# Patient Record
Sex: FEMALE | Race: WHITE | NOT HISPANIC OR LATINO | URBAN - METROPOLITAN AREA
[De-identification: names, ages, dates, MRNs, and addresses within clinical notes are randomized per-mention and may not be internally consistent; named-entity substitution may affect disease eponyms.]

---

## 2023-11-20 ENCOUNTER — APPOINTMENT (OUTPATIENT)
Dept: PRIMARY CARE | Facility: CLINIC | Age: 28
End: 2023-11-20
Payer: COMMERCIAL

## 2023-12-13 ENCOUNTER — OFFICE VISIT (OUTPATIENT)
Dept: PRIMARY CARE | Facility: CLINIC | Age: 28
End: 2023-12-13
Payer: COMMERCIAL

## 2023-12-13 ENCOUNTER — LAB (OUTPATIENT)
Dept: LAB | Facility: LAB | Age: 28
End: 2023-12-13
Payer: COMMERCIAL

## 2023-12-13 VITALS
SYSTOLIC BLOOD PRESSURE: 96 MMHG | DIASTOLIC BLOOD PRESSURE: 70 MMHG | OXYGEN SATURATION: 98 % | BODY MASS INDEX: 26.83 KG/M2 | WEIGHT: 145.8 LBS | TEMPERATURE: 98.2 F | HEIGHT: 62 IN | HEART RATE: 75 BPM

## 2023-12-13 DIAGNOSIS — F32.A DEPRESSION, UNSPECIFIED DEPRESSION TYPE: ICD-10-CM

## 2023-12-13 DIAGNOSIS — M25.50 MULTIPLE JOINT PAIN: ICD-10-CM

## 2023-12-13 DIAGNOSIS — F41.1 GENERALIZED ANXIETY DISORDER: Primary | ICD-10-CM

## 2023-12-13 DIAGNOSIS — R53.83 FATIGUE, UNSPECIFIED TYPE: ICD-10-CM

## 2023-12-13 DIAGNOSIS — T69.1XXA CHILBLAINS, INITIAL ENCOUNTER: ICD-10-CM

## 2023-12-13 PROBLEM — L70.0 ACNE VULGARIS: Status: ACTIVE | Noted: 2020-11-22

## 2023-12-13 LAB
ALBUMIN SERPL BCP-MCNC: 4.7 G/DL (ref 3.4–5)
ALP SERPL-CCNC: 56 U/L (ref 33–110)
ALT SERPL W P-5'-P-CCNC: 13 U/L (ref 7–45)
ANION GAP SERPL CALC-SCNC: 12 MMOL/L (ref 10–20)
AST SERPL W P-5'-P-CCNC: 18 U/L (ref 9–39)
BASOPHILS # BLD AUTO: 0.08 X10*3/UL (ref 0–0.1)
BASOPHILS NFR BLD AUTO: 1.3 %
BILIRUB SERPL-MCNC: 0.5 MG/DL (ref 0–1.2)
BUN SERPL-MCNC: 11 MG/DL (ref 6–23)
CALCIUM SERPL-MCNC: 9.2 MG/DL (ref 8.6–10.3)
CHLORIDE SERPL-SCNC: 103 MMOL/L (ref 98–107)
CO2 SERPL-SCNC: 27 MMOL/L (ref 21–32)
CREAT SERPL-MCNC: 0.85 MG/DL (ref 0.5–1.05)
EOSINOPHIL # BLD AUTO: 0.22 X10*3/UL (ref 0–0.7)
EOSINOPHIL NFR BLD AUTO: 3.6 %
ERYTHROCYTE [DISTWIDTH] IN BLOOD BY AUTOMATED COUNT: 13.1 % (ref 11.5–14.5)
ERYTHROCYTE [SEDIMENTATION RATE] IN BLOOD BY WESTERGREN METHOD: 4 MM/H (ref 0–20)
GFR SERPL CREATININE-BSD FRML MDRD: >90 ML/MIN/1.73M*2
GLUCOSE SERPL-MCNC: 81 MG/DL (ref 74–99)
HCT VFR BLD AUTO: 44.5 % (ref 36–46)
HGB BLD-MCNC: 14.6 G/DL (ref 12–16)
IMM GRANULOCYTES # BLD AUTO: 0.01 X10*3/UL (ref 0–0.7)
IMM GRANULOCYTES NFR BLD AUTO: 0.2 % (ref 0–0.9)
LYMPHOCYTES # BLD AUTO: 1.8 X10*3/UL (ref 1.2–4.8)
LYMPHOCYTES NFR BLD AUTO: 29.4 %
MCH RBC QN AUTO: 29.3 PG (ref 26–34)
MCHC RBC AUTO-ENTMCNC: 32.8 G/DL (ref 32–36)
MCV RBC AUTO: 89 FL (ref 80–100)
MONOCYTES # BLD AUTO: 0.49 X10*3/UL (ref 0.1–1)
MONOCYTES NFR BLD AUTO: 8 %
NEUTROPHILS # BLD AUTO: 3.52 X10*3/UL (ref 1.2–7.7)
NEUTROPHILS NFR BLD AUTO: 57.5 %
NRBC BLD-RTO: 0 /100 WBCS (ref 0–0)
PLATELET # BLD AUTO: 270 X10*3/UL (ref 150–450)
POTASSIUM SERPL-SCNC: 4.5 MMOL/L (ref 3.5–5.3)
PROT SERPL-MCNC: 7.3 G/DL (ref 6.4–8.2)
RBC # BLD AUTO: 4.99 X10*6/UL (ref 4–5.2)
SODIUM SERPL-SCNC: 137 MMOL/L (ref 136–145)
TSH SERPL-ACNC: 1.83 MIU/L (ref 0.44–3.98)
URATE SERPL-MCNC: 3.2 MG/DL (ref 2.3–6.7)
WBC # BLD AUTO: 6.1 X10*3/UL (ref 4.4–11.3)

## 2023-12-13 PROCEDURE — 85025 COMPLETE CBC W/AUTO DIFF WBC: CPT

## 2023-12-13 PROCEDURE — 84443 ASSAY THYROID STIM HORMONE: CPT

## 2023-12-13 PROCEDURE — 86038 ANTINUCLEAR ANTIBODIES: CPT

## 2023-12-13 PROCEDURE — 1036F TOBACCO NON-USER: CPT | Performed by: NURSE PRACTITIONER

## 2023-12-13 PROCEDURE — 86431 RHEUMATOID FACTOR QUANT: CPT

## 2023-12-13 PROCEDURE — 80053 COMPREHEN METABOLIC PANEL: CPT

## 2023-12-13 PROCEDURE — 84550 ASSAY OF BLOOD/URIC ACID: CPT

## 2023-12-13 PROCEDURE — 36415 COLL VENOUS BLD VENIPUNCTURE: CPT

## 2023-12-13 PROCEDURE — 99203 OFFICE O/P NEW LOW 30 MIN: CPT | Performed by: NURSE PRACTITIONER

## 2023-12-13 PROCEDURE — 85652 RBC SED RATE AUTOMATED: CPT

## 2023-12-13 RX ORDER — OFLOXACIN 3 MG/ML
SOLUTION AURICULAR (OTIC) EVERY 24 HOURS
COMMUNITY
Start: 2023-12-06 | End: 2024-01-15 | Stop reason: ALTCHOICE

## 2023-12-13 RX ORDER — AMOXICILLIN AND CLAVULANATE POTASSIUM 875; 125 MG/1; MG/1
TABLET, FILM COATED ORAL EVERY 12 HOURS
COMMUNITY
Start: 2023-11-28 | End: 2024-01-15 | Stop reason: ALTCHOICE

## 2023-12-13 RX ORDER — SERTRALINE HYDROCHLORIDE 50 MG/1
50 TABLET, FILM COATED ORAL DAILY
Qty: 30 TABLET | Refills: 0 | Status: SHIPPED | OUTPATIENT
Start: 2023-12-13 | End: 2024-01-15 | Stop reason: SDUPTHER

## 2023-12-13 ASSESSMENT — ENCOUNTER SYMPTOMS
COUGH: 0
CHILLS: 0
BACK PAIN: 1
NECK STIFFNESS: 1
EYE DISCHARGE: 0
DYSPHORIC MOOD: 1
SINUS PRESSURE: 0
NERVOUS/ANXIOUS: 1
DIFFICULTY URINATING: 0
VOMITING: 0
FREQUENCY: 0
NECK PAIN: 1
POLYDIPSIA: 0
WHEEZING: 0
EYE PAIN: 0
WEAKNESS: 0
SLEEP DISTURBANCE: 1
ABDOMINAL PAIN: 0
CHEST TIGHTNESS: 0
MYALGIAS: 0
CONSTIPATION: 0
ARTHRALGIAS: 1
PHOTOPHOBIA: 0
POLYPHAGIA: 0
HEMATURIA: 0
SHORTNESS OF BREATH: 0
PALPITATIONS: 0
DIARRHEA: 0
RHINORRHEA: 0
EYE ITCHING: 0
EYE REDNESS: 0
SORE THROAT: 0
HEADACHES: 0
NAUSEA: 0
BLOOD IN STOOL: 0
DIZZINESS: 1
FATIGUE: 1
ADENOPATHY: 0
UNEXPECTED WEIGHT CHANGE: 0
FEVER: 0
NUMBNESS: 0
SPEECH DIFFICULTY: 0
DYSURIA: 0
BRUISES/BLEEDS EASILY: 0

## 2023-12-13 ASSESSMENT — PATIENT HEALTH QUESTIONNAIRE - PHQ9
4. FEELING TIRED OR HAVING LITTLE ENERGY: NEARLY EVERY DAY
7. TROUBLE CONCENTRATING ON THINGS, SUCH AS READING THE NEWSPAPER OR WATCHING TELEVISION: SEVERAL DAYS
8. MOVING OR SPEAKING SO SLOWLY THAT OTHER PEOPLE COULD HAVE NOTICED. OR THE OPPOSITE, BEING SO FIGETY OR RESTLESS THAT YOU HAVE BEEN MOVING AROUND A LOT MORE THAN USUAL: NEARLY EVERY DAY
SUM OF ALL RESPONSES TO PHQ QUESTIONS 1-9: 19
5. POOR APPETITE OR OVEREATING: MORE THAN HALF THE DAYS
3. TROUBLE FALLING OR STAYING ASLEEP OR SLEEPING TOO MUCH: NEARLY EVERY DAY
10. IF YOU CHECKED OFF ANY PROBLEMS, HOW DIFFICULT HAVE THESE PROBLEMS MADE IT FOR YOU TO DO YOUR WORK, TAKE CARE OF THINGS AT HOME, OR GET ALONG WITH OTHER PEOPLE: VERY DIFFICULT
SUM OF ALL RESPONSES TO PHQ9 QUESTIONS 1 AND 2: 6
2. FEELING DOWN, DEPRESSED OR HOPELESS: SEVERAL DAYS
9. THOUGHTS THAT YOU WOULD BE BETTER OFF DEAD, OR OF HURTING YOURSELF: NOT AT ALL
SUM OF ALL RESPONSES TO PHQ9 QUESTIONS 1 AND 2: 4
1. LITTLE INTEREST OR PLEASURE IN DOING THINGS: NEARLY EVERY DAY
2. FEELING DOWN, DEPRESSED OR HOPELESS: NEARLY EVERY DAY
1. LITTLE INTEREST OR PLEASURE IN DOING THINGS: NEARLY EVERY DAY
6. FEELING BAD ABOUT YOURSELF - OR THAT YOU ARE A FAILURE OR HAVE LET YOURSELF OR YOUR FAMILY DOWN: NEARLY EVERY DAY

## 2023-12-13 ASSESSMENT — ANXIETY QUESTIONNAIRES
7. FEELING AFRAID AS IF SOMETHING AWFUL MIGHT HAPPEN: MORE THAN HALF THE DAYS
IF YOU CHECKED OFF ANY PROBLEMS ON THIS QUESTIONNAIRE, HOW DIFFICULT HAVE THESE PROBLEMS MADE IT FOR YOU TO DO YOUR WORK, TAKE CARE OF THINGS AT HOME, OR GET ALONG WITH OTHER PEOPLE: VERY DIFFICULT
4. TROUBLE RELAXING: MORE THAN HALF THE DAYS
GAD7 TOTAL SCORE: 18
6. BECOMING EASILY ANNOYED OR IRRITABLE: NEARLY EVERY DAY
1. FEELING NERVOUS, ANXIOUS, OR ON EDGE: NEARLY EVERY DAY
3. WORRYING TOO MUCH ABOUT DIFFERENT THINGS: NEARLY EVERY DAY
5. BEING SO RESTLESS THAT IT IS HARD TO SIT STILL: MORE THAN HALF THE DAYS
2. NOT BEING ABLE TO STOP OR CONTROL WORRYING: NEARLY EVERY DAY

## 2023-12-13 NOTE — PATIENT INSTRUCTIONS
Begin taking the Sertraline- take 0.5 tablet (25 mg) by mouth daily for one week, then increase to one tablet (50 mg).  Obtain the blood work as ordered today.  Follow up in one month.

## 2023-12-13 NOTE — PROGRESS NOTES
"Subjective Elexandra Lombardo is a 28 y.o. female who presents for New Patient Visit (Establish care and here because she has questions. She hasn't seen anyone in awhile.).    HPI  She presents the office today to establish care and discuss mood and multiple joint pain.  (+) Feeling sad, down, helpless and hopeless at times  Motivation is a struggle. Going to work is a struggle.   Lacks energy.  (+) emotional- things she reads causes her to be very emotional. (Mainly social media)  Works as a cosmotologist  No SI or HI.  (+) excessive worry mainly about son with autism  (+) Worry about worst case scenarios.  No panic attacks.  Was on Lexapro in the past about 8 years ago. She did not like how she felt on it.    Counseling: Currently in therapy online for the past 5-6 months.  Sleep is an issues- has a hard time staying asleep.   Marijuana nightly to help with sleep.  Diet: \"not great\" Has an addiction to fast food.  Exercise: No regular exercise.  Caffeine use: 2 cups of coffee a day  Alcohol use: 2 drinks a week  Drug use: THC at night  Lives with son and .  is supportive.    She reports she has always dealt with extreme fatigue.  (+) aching in all joints.  Has been getting a burning rash to her face after a shower.  In March had an episode where most of her toes turned white and numb. Stayed white for most of the day. Then feet starting to burn and developed little blisters that took a long time to go away.   She has pictures on her phone.  She is concerned because several of her family member have Lupus.    Review of Systems   Constitutional:  Positive for fatigue. Negative for chills, fever and unexpected weight change.   HENT:  Negative for congestion, ear pain, hearing loss, nosebleeds, postnasal drip, rhinorrhea, sinus pressure, sore throat and tinnitus.    Eyes:  Negative for photophobia, pain, discharge, redness, itching and visual disturbance.   Respiratory:  Negative for cough, chest " "tightness, shortness of breath and wheezing.    Cardiovascular:  Negative for chest pain, palpitations and leg swelling.   Gastrointestinal:  Negative for abdominal pain, blood in stool, constipation, diarrhea, nausea and vomiting.   Endocrine: Negative for cold intolerance, heat intolerance, polydipsia, polyphagia and polyuria.   Genitourinary:  Negative for difficulty urinating, dysuria, frequency, hematuria and urgency.   Musculoskeletal:  Positive for arthralgias, back pain, neck pain and neck stiffness. Negative for myalgias.   Skin:  Negative for rash.   Neurological:  Positive for dizziness. Negative for syncope, speech difficulty, weakness, numbness and headaches.        Vertigo occasionally for years.   Hematological:  Negative for adenopathy. Does not bruise/bleed easily.   Psychiatric/Behavioral:  Positive for dysphoric mood and sleep disturbance. Negative for suicidal ideas. The patient is nervous/anxious.      Objective   BP 96/70 (BP Location: Left arm, Patient Position: Sitting)   Pulse 75   Temp 36.8 °C (98.2 °F) (Temporal)   Ht 1.578 m (5' 2.13\")   Wt 66.1 kg (145 lb 12.8 oz)   SpO2 98%   BMI 26.56 kg/m²     Physical Exam  Constitutional:       General: She is not in acute distress.     Appearance: Normal appearance. She is not toxic-appearing.   Eyes:      Extraocular Movements: Extraocular movements intact.      Conjunctiva/sclera: Conjunctivae normal.      Pupils: Pupils are equal, round, and reactive to light.   Neck:      Thyroid: No thyroid mass or thyromegaly.   Cardiovascular:      Rate and Rhythm: Normal rate and regular rhythm.      Pulses: Normal pulses.      Heart sounds: Normal heart sounds, S1 normal and S2 normal. No murmur heard.  Pulmonary:      Effort: Pulmonary effort is normal. No respiratory distress.      Breath sounds: Normal breath sounds.   Abdominal:      General: Bowel sounds are normal.      Palpations: Abdomen is soft.      Tenderness: There is no abdominal " tenderness.   Musculoskeletal:      Right lower leg: No edema.      Left lower leg: No edema.   Lymphadenopathy:      Cervical: No cervical adenopathy.   Neurological:      Mental Status: She is alert and oriented to person, place, and time.   Psychiatric:         Attention and Perception: Attention normal.         Mood and Affect: Mood and affect normal.         Behavior: Behavior normal. Behavior is cooperative.         Thought Content: Thought content normal.         Cognition and Memory: Cognition normal.         Judgment: Judgment normal.         Assessment/Plan   Problem List Items Addressed This Visit       Fatigue     Check labs today.         Relevant Orders    Comprehensive Metabolic Panel (Completed)    CBC and Auto Differential (Completed)    Arthritis Panel (CMS)    TSH (Completed)    Erythema pernio     Check arthritis panel.          Relevant Orders    Comprehensive Metabolic Panel (Completed)    CBC and Auto Differential (Completed)    Arthritis Panel (CMS)    TSH (Completed)    Depression     Start Sertraline as directed. Follow up in one month.         Relevant Medications    sertraline (Zoloft) 50 mg tablet    Generalized anxiety disorder - Primary     Start Sertraline as directed. Follow up in one month.         Relevant Medications    sertraline (Zoloft) 50 mg tablet     Other Visit Diagnoses       Multiple joint pain        Relevant Orders    Comprehensive Metabolic Panel (Completed)    CBC and Auto Differential (Completed)    Arthritis Panel (CMS)    TSH (Completed)            It has been a pleasure seeing you today!

## 2023-12-14 LAB
ANA SER QL HEP2 SUBST: NEGATIVE
RHEUMATOID FACT SER NEPH-ACNC: <10 IU/ML (ref 0–15)

## 2023-12-15 DIAGNOSIS — R53.83 FATIGUE, UNSPECIFIED TYPE: ICD-10-CM

## 2023-12-15 DIAGNOSIS — T69.1XXA CHILBLAINS, INITIAL ENCOUNTER: Primary | ICD-10-CM

## 2024-01-15 ENCOUNTER — TELEMEDICINE (OUTPATIENT)
Dept: PRIMARY CARE | Facility: CLINIC | Age: 29
End: 2024-01-15
Payer: COMMERCIAL

## 2024-01-15 DIAGNOSIS — F41.1 GENERALIZED ANXIETY DISORDER: ICD-10-CM

## 2024-01-15 DIAGNOSIS — F32.A DEPRESSION, UNSPECIFIED DEPRESSION TYPE: Primary | ICD-10-CM

## 2024-01-15 PROCEDURE — 99213 OFFICE O/P EST LOW 20 MIN: CPT | Performed by: NURSE PRACTITIONER

## 2024-01-15 RX ORDER — SERTRALINE HYDROCHLORIDE 50 MG/1
50 TABLET, FILM COATED ORAL DAILY
Qty: 90 TABLET | Refills: 0 | Status: SHIPPED | OUTPATIENT
Start: 2024-01-15 | End: 2024-05-07 | Stop reason: SDUPTHER

## 2024-01-15 ASSESSMENT — PATIENT HEALTH QUESTIONNAIRE - PHQ9
2. FEELING DOWN, DEPRESSED OR HOPELESS: MORE THAN HALF THE DAYS
1. LITTLE INTEREST OR PLEASURE IN DOING THINGS: NOT AT ALL
SUM OF ALL RESPONSES TO PHQ9 QUESTIONS 1 AND 2: 2

## 2024-01-15 ASSESSMENT — ENCOUNTER SYMPTOMS
CHILLS: 0
SLEEP DISTURBANCE: 0
NERVOUS/ANXIOUS: 1
FATIGUE: 0
FEVER: 0
DYSPHORIC MOOD: 1

## 2024-01-16 NOTE — PROGRESS NOTES
Subjective   Elexandra Lombardo is a 28 y.o. female who presents for Follow-up (1 mon fu on depression).    An interactive audio and video telecommunication system which permits real time communications between the patient (at the originating site) and provider (at the distant site) was utilized to provide this telehealth service.    Verbal consent was requested and obtained from Elexandra Lombardo for a telehealth visit. All issues as below were discussed and addressed but no physical exam was performed. If it was felt that the patient should be evaluated in the clinic then they were directed there. The patient verbally consented to the visit.  She was located in the [facility] for the visit.        HPI  She presents today virtually for a follow-up on anxiety and depression.  She is taking the medication as prescribed. Taking daily in the morning.  Side effects: Occasional nausea but not occurring every day.  (+) Feeling sad, down, helpless and hopeless, but improved  Overall much less emotional than she used to be- less crying  Motivation is still not great.  No SI or HI.  Reports anxiety has improved some- a little less often.  No panic attacks.     Sleep: Better  Still uses marijuana at night to help    Review of Systems   Constitutional:  Negative for chills, fatigue and fever.   Psychiatric/Behavioral:  Positive for dysphoric mood. Negative for self-injury, sleep disturbance and suicidal ideas. The patient is nervous/anxious.        Objective   There were no vitals taken for this visit.    Physical Exam  Constitutional:       General: She is not in acute distress.     Appearance: Normal appearance. She is not toxic-appearing.   Pulmonary:      Effort: Pulmonary effort is normal. No respiratory distress.   Neurological:      Mental Status: She is alert and oriented to person, place, and time.   Psychiatric:         Mood and Affect: Mood normal.         Behavior: Behavior normal.         Thought Content:  Thought content normal.         Judgment: Judgment normal.         Assessment/Plan   Problem List Items Addressed This Visit       Depression - Primary     Improved on the sertraline 50 mg daily.  Will continue with current dose and follow-up in 3 months.         Relevant Medications    sertraline (Zoloft) 50 mg tablet    Generalized anxiety disorder     Improved on the sertraline 50 mg daily.  Will continue with current dose and follow-up in 3 months.         Relevant Medications    sertraline (Zoloft) 50 mg tablet       It has been a pleasure seeing you today!

## 2024-01-16 NOTE — ASSESSMENT & PLAN NOTE
Improved on the sertraline 50 mg daily.  Will continue with current dose and follow-up in 3 months.

## 2024-05-07 DIAGNOSIS — F32.A DEPRESSION, UNSPECIFIED DEPRESSION TYPE: ICD-10-CM

## 2024-05-07 DIAGNOSIS — F41.1 GENERALIZED ANXIETY DISORDER: ICD-10-CM

## 2024-05-07 RX ORDER — SERTRALINE HYDROCHLORIDE 50 MG/1
50 TABLET, FILM COATED ORAL DAILY
Qty: 90 TABLET | Refills: 0 | Status: SHIPPED | OUTPATIENT
Start: 2024-05-07

## 2024-06-11 ENCOUNTER — PROCEDURE VISIT (OUTPATIENT)
Dept: OBSTETRICS AND GYNECOLOGY | Facility: CLINIC | Age: 29
End: 2024-06-11
Payer: COMMERCIAL

## 2024-06-11 VITALS
BODY MASS INDEX: 26.63 KG/M2 | WEIGHT: 146.16 LBS | DIASTOLIC BLOOD PRESSURE: 70 MMHG | SYSTOLIC BLOOD PRESSURE: 110 MMHG

## 2024-06-11 DIAGNOSIS — Z11.51 SCREENING FOR HUMAN PAPILLOMAVIRUS (HPV): ICD-10-CM

## 2024-06-11 DIAGNOSIS — Z30.432 ENCOUNTER FOR IUD REMOVAL: ICD-10-CM

## 2024-06-11 DIAGNOSIS — Z01.419 ENCOUNTER FOR WELL WOMAN EXAM WITH ROUTINE GYNECOLOGICAL EXAM: Primary | ICD-10-CM

## 2024-06-11 DIAGNOSIS — Z12.4 SCREENING FOR MALIGNANT NEOPLASM OF CERVIX: ICD-10-CM

## 2024-06-11 PROCEDURE — 99385 PREV VISIT NEW AGE 18-39: CPT | Performed by: NURSE PRACTITIONER

## 2024-06-11 PROCEDURE — 58301 REMOVE INTRAUTERINE DEVICE: CPT | Performed by: NURSE PRACTITIONER

## 2024-06-11 PROCEDURE — 87624 HPV HI-RISK TYP POOLED RSLT: CPT

## 2024-06-11 NOTE — ASSESSMENT & PLAN NOTE
Here for well woman exam. She is doing well. Needs her IUD removed. She is considering another pregnancy in the future. She will start a prenatal vitamin. PAP obtained.

## 2024-06-11 NOTE — PROGRESS NOTES
"     HPI:   Elexandra Lombardo \"Micheal\" is a 28 y.o. who presents today for her annual gynecologic exam without complaints    She has the following concerns; None. Doing well.  Would like her iud removed. Considering another pregnancy. Declines any other type of birth control. She is due for a PAP.     GYN HISTORY:  Periods are rare, lasting 1 days. With Mirena.   Dysmenorrhea:none. Cyclic symptoms include none.   No intermenstrual bleeding, spotting, or discharge.    Current contraception: IUD      Requests STD testing: no     PAP History   Last pap:   2021 Normal HPV Not done  History of abnormal pap: no    Health Screening  Family history of breast, uterine, ovarian or colon cancer: yes - paternal aunt has breast cancer          The patient feels safe at home.         Review of Systems:   Constitutional: no fever and no chills.  Cardiovascular: no chest pain.   Respiratory: no shortness of breath.   Gastrointestinal: no nausea, no abdominal pain and no constipation  Genitourinary: no dysuria, no urinary incontinence, no vaginal dryness, no pelvic pain and no vaginal discharge.   Neurological: no headache.  Psychiatric: no anxiety and no depression.              Objective         /70   Wt 66.3 kg (146 lb 2.6 oz)   BMI 26.63 kg/m²         Physical Exam:   Constitutional: Alert and in no acute distress. Well developed, well nourished.      Neck: No neck asymmetry. Supple. Thyroid not enlarged and there were no palpable thyroid nodules.      Cardiovascular: Heart rate and rhythm were normal, normal S1 and S2, no gallops, and no murmurs.      Pulmonary: No respiratory distress. Clear bilateral breath sounds.      Chest: Breasts: Normal appearance, no nipple discharge and no skin changes. Palpation of breasts and axillae: No palpable mass and no axillary lymphadenopathy.      Abdomen: Soft nontender; no abdominal mass palpated. Normal bowel sounds. No organomegaly.      Genitourinary:   - External genitalia: " "Normal.   - Palpation of lymph nodes in groin: No inguinal lymphadenopathy.   - Bartholin's Urethral and Skenes Glands: Normal.   - Urethra: Normal.    -Bladder: Normal on palpation.   - Vagina: Normal.   - Cervix: Normal.   - Uterus: Normal. Right Adnexa/parametria: Normal. Left Adnexa/parametria: Normal.   - Perianal Area: Normal.      Skin: Normal skin color and pigmentation, normal skin turgor, and no rash     Psychiatric: Alert and oriented x 3. Affect normal to patient baseline. Mood: Appropriate.   Patient ID: Elexandra Lombardo \"Micheal\" is a 28 y.o. female.    IUD Removal    Date/Time: 6/11/2024 2:58 PM    Performed by: GAUDENCIO Rangel  Authorized by: GAUDENCIO Rangel    Consent:     Consent obtained:  Verbal and written    Consent given by:  Patient    Procedure risks and benefits discussed: yes      Patient questions answered: yes      Patient agrees, verbalizes understanding, and wants to proceed: yes      Educational handouts given: yes      Instructions and paperwork completed: yes    Universal protocol:     Patient states understanding of procedure being performed: yes      Required blood products, implants, devices, and special equipment available: yes    Procedure:     Removed with no complications: yes             Assessment/Plan       Diagnoses and all orders for this visit:  Encounter for well woman exam with routine gynecological exam   Here for well woman exam. She is doing well. Needs her IUD removed. She is considering another pregnancy in the future. She will start a prenatal vitamin. PAP obtained.   Encounter for IUD removal  Procedure per note.   Screening for malignant neoplasm of cervix  thinprep  Screening for human papillomavirus (HPV)  Thinprep  Follow-up annually sooner if needed or with a positive pregnancy test.         GAUDENCIO Rangel   "

## 2024-06-12 ENCOUNTER — APPOINTMENT (OUTPATIENT)
Dept: PRIMARY CARE | Facility: CLINIC | Age: 29
End: 2024-06-12
Payer: COMMERCIAL

## 2024-06-12 VITALS
WEIGHT: 144.8 LBS | TEMPERATURE: 97.9 F | DIASTOLIC BLOOD PRESSURE: 72 MMHG | OXYGEN SATURATION: 98 % | HEART RATE: 61 BPM | BODY MASS INDEX: 26.38 KG/M2 | SYSTOLIC BLOOD PRESSURE: 104 MMHG

## 2024-06-12 DIAGNOSIS — F41.1 GENERALIZED ANXIETY DISORDER: Primary | ICD-10-CM

## 2024-06-12 DIAGNOSIS — F32.A DEPRESSION, UNSPECIFIED DEPRESSION TYPE: ICD-10-CM

## 2024-06-12 PROCEDURE — 99214 OFFICE O/P EST MOD 30 MIN: CPT | Performed by: NURSE PRACTITIONER

## 2024-06-12 PROCEDURE — 1036F TOBACCO NON-USER: CPT | Performed by: NURSE PRACTITIONER

## 2024-06-12 ASSESSMENT — ENCOUNTER SYMPTOMS
CHILLS: 0
DYSPHORIC MOOD: 0
FATIGUE: 0
SLEEP DISTURBANCE: 0
NERVOUS/ANXIOUS: 1
FEVER: 0

## 2024-06-12 ASSESSMENT — PATIENT HEALTH QUESTIONNAIRE - PHQ9
1. LITTLE INTEREST OR PLEASURE IN DOING THINGS: NOT AT ALL
2. FEELING DOWN, DEPRESSED OR HOPELESS: NOT AT ALL
SUM OF ALL RESPONSES TO PHQ9 QUESTIONS 1 AND 2: 0

## 2024-06-12 NOTE — PATIENT INSTRUCTIONS
Increase the Sertraline to 75 mg (1.5 tablet) daily.  Continue with a healthy diet and exercise.   Begin taking a prenatal.  Follow up virtually in 3 months.  
no anosmia, dysgeusia, sore throat, rhinorhea, HAs

## 2024-06-12 NOTE — PROGRESS NOTES
"Subjective Elexandra Lombardo \"El\" is a 28 y.o. female who presents for Medication follow up.    HPI  She presents to the office today for a follow up on anxiety.  She is taking the Sertraline as prescribed. Taking daily in the morning.   Side effects: Nausea if takes on an empty stomach  No feeling sad, down, helpless or hopeless.  Motivation is good.  No SI or HI.  (+) excessive worry but feels the medication has helped  Recently anxiety has been heightened.   Son with autism has been having behavior issues at his  and she is afraid he may get kicked out.  Feels an increase in the medication would be beneficial.   Much less worry about worst case scenarios.  No panic attacks.    Counseling Recently stopped- she did not feel it was very helpful.  Sleep: falls asleep easier due to less mind racing.   Diet: Has cut out fast food. Has been cooking more.  Exercise: started going to the gym 5 days a week.  Caffeine use: 1-2 cups of caffeine  Alcohol use: none  Drug use: Marijuana at night.    She has a supportive        She reports she had swelling in all 4 extremities (wrist and hands and both feet) with tingling in forearms and fingers. No associated erythema or warmth noted.  This went away in a couple days. It has resolved today.  No chest pain, sob or heart palpitations.  No cough.   Has an appointment in about a month with rheumatology    Review of Systems   Constitutional:  Negative for chills, fatigue and fever.   Psychiatric/Behavioral:  Negative for dysphoric mood, self-injury, sleep disturbance and suicidal ideas. The patient is nervous/anxious.      Objective   /72 (BP Location: Left arm, Patient Position: Sitting)   Pulse 61   Temp 36.6 °C (97.9 °F) (Temporal)   Wt 65.7 kg (144 lb 12.8 oz)   SpO2 98%   BMI 26.38 kg/m²     Physical Exam  Constitutional:       General: She is not in acute distress.     Appearance: Normal appearance. She is not toxic-appearing.   Eyes:      " Extraocular Movements: Extraocular movements intact.      Conjunctiva/sclera: Conjunctivae normal.      Pupils: Pupils are equal, round, and reactive to light.   Cardiovascular:      Rate and Rhythm: Normal rate and regular rhythm.      Pulses: Normal pulses.      Heart sounds: Normal heart sounds, S1 normal and S2 normal. No murmur heard.  Pulmonary:      Effort: Pulmonary effort is normal. No respiratory distress.      Breath sounds: Normal breath sounds.   Abdominal:      General: Bowel sounds are normal.      Palpations: Abdomen is soft.      Tenderness: There is no abdominal tenderness.   Musculoskeletal:      Right lower leg: No edema.      Left lower leg: No edema.   Lymphadenopathy:      Cervical: No cervical adenopathy.   Neurological:      Mental Status: She is alert and oriented to person, place, and time.   Psychiatric:         Attention and Perception: Attention normal.         Mood and Affect: Mood and affect normal.         Behavior: Behavior normal. Behavior is cooperative.         Thought Content: Thought content normal.         Cognition and Memory: Cognition normal.         Judgment: Judgment normal.       Assessment/Plan   Problem List Items Addressed This Visit       Depression     Well-controlled on current dose of sertraline.         Generalized anxiety disorder - Primary     Increase in anxiety lately.  Will increase the sertraline to 75 mg (1.5 tablets) by mouth daily.            It has been a pleasure seeing you today!

## 2024-06-25 ENCOUNTER — APPOINTMENT (OUTPATIENT)
Dept: OBSTETRICS AND GYNECOLOGY | Facility: CLINIC | Age: 29
End: 2024-06-25
Payer: COMMERCIAL

## 2024-06-26 LAB
CYTOLOGY CMNT CVX/VAG CYTO-IMP: NORMAL
HPV HR 12 DNA GENITAL QL NAA+PROBE: NEGATIVE
HPV HR GENOTYPES PNL CVX NAA+PROBE: NEGATIVE
HPV16 DNA SPEC QL NAA+PROBE: NEGATIVE
HPV18 DNA SPEC QL NAA+PROBE: NEGATIVE
LAB AP CONTRACEPTIVE HISTORY: NORMAL
LAB AP HPV GENOTYPE QUESTION: YES
LAB AP HPV HR: NORMAL
LABORATORY COMMENT REPORT: NORMAL
PATH REPORT.TOTAL CANCER: NORMAL

## 2024-07-08 ENCOUNTER — APPOINTMENT (OUTPATIENT)
Dept: OBSTETRICS AND GYNECOLOGY | Facility: CLINIC | Age: 29
End: 2024-07-08
Payer: COMMERCIAL

## 2024-07-24 ENCOUNTER — LAB (OUTPATIENT)
Dept: LAB | Facility: LAB | Age: 29
End: 2024-07-24
Payer: COMMERCIAL

## 2024-07-24 ENCOUNTER — APPOINTMENT (OUTPATIENT)
Dept: RHEUMATOLOGY | Facility: CLINIC | Age: 29
End: 2024-07-24
Payer: COMMERCIAL

## 2024-07-24 VITALS
SYSTOLIC BLOOD PRESSURE: 109 MMHG | HEART RATE: 82 BPM | TEMPERATURE: 98.4 F | HEIGHT: 62 IN | OXYGEN SATURATION: 98 % | BODY MASS INDEX: 26.13 KG/M2 | WEIGHT: 142 LBS | DIASTOLIC BLOOD PRESSURE: 68 MMHG

## 2024-07-24 DIAGNOSIS — R53.82 CHRONIC FATIGUE: ICD-10-CM

## 2024-07-24 DIAGNOSIS — M65.9 TENOSYNOVITIS: ICD-10-CM

## 2024-07-24 DIAGNOSIS — G56.01 CARPAL TUNNEL SYNDROME OF RIGHT WRIST: ICD-10-CM

## 2024-07-24 DIAGNOSIS — R53.82 CHRONIC FATIGUE: Primary | ICD-10-CM

## 2024-07-24 LAB
CRP SERPL-MCNC: 0.1 MG/DL
ERYTHROCYTE [SEDIMENTATION RATE] IN BLOOD BY WESTERGREN METHOD: <1 MM/H (ref 0–20)

## 2024-07-24 PROCEDURE — 86140 C-REACTIVE PROTEIN: CPT

## 2024-07-24 PROCEDURE — 85652 RBC SED RATE AUTOMATED: CPT

## 2024-07-24 PROCEDURE — 1036F TOBACCO NON-USER: CPT | Performed by: STUDENT IN AN ORGANIZED HEALTH CARE EDUCATION/TRAINING PROGRAM

## 2024-07-24 PROCEDURE — 3008F BODY MASS INDEX DOCD: CPT | Performed by: STUDENT IN AN ORGANIZED HEALTH CARE EDUCATION/TRAINING PROGRAM

## 2024-07-24 PROCEDURE — 36415 COLL VENOUS BLD VENIPUNCTURE: CPT

## 2024-07-24 PROCEDURE — 99204 OFFICE O/P NEW MOD 45 MIN: CPT | Performed by: STUDENT IN AN ORGANIZED HEALTH CARE EDUCATION/TRAINING PROGRAM

## 2024-07-24 NOTE — PROGRESS NOTES
I saw and evaluated the patient. I personally obtained the key and critical portions of the history and physical exam or was physically present for key and critical portions performed by the trainee. I reviewed the trainee's documentation and discussed the patient with the trainee. I agree with the trainee's medical decision making, as documented on the trainee's note.     Silvano Mendez MD

## 2024-07-24 NOTE — PROGRESS NOTES
"Rheumatology Clinic  [facility]  New Patient Clinic Visit    28 y.o.      Patient ID: [id number]   Elexandra Lombardo \"Micheal\" is a 28 y.o. female who presents for New Patient Visit (FATIGUE)      HPI: PMH mild anxiety on sertraline, post-partum depression, hx of prior PTSD following violent passing of her sister, follows with counselor    She endorses symptoms of chronic fatigue symptoms since 16 years old, getting worse in the past year. Several years ago she used to fall asleep in the day time. Recently she has been waking up several times throughout the night, no snoring or pauses in breathing. Has tried melatonin but not effective    She endorses diffuse bodily aches on the back and hips bilaterally, with mild weakness in the arms. She notices that sometimes in colder weather she will get whiteness in her hands/toes with sores on the toes will get white with colder weather. She endorses joint pain and stiffness in the right wrist and both hands finger joints and left thumb. Takes ibuprofen PRN which does help but caused stomach ulcers. Occasionally she notices a facial rash on her face but does not endorse photosensitivity.     FH: M.S in mother, four cousins with lupus, one cousin with hashimotos    Current rheum meds:  - N/A    Previous rheum meds:  - N/A    Patient Active Problem List   Diagnosis    Acne vulgaris    Fatigue    Erythema pernio    Depression    Generalized anxiety disorder    Encounter for well woman exam with routine gynecological exam         Past Medical History:   Diagnosis Date    Allergic     Anxiety     Eczema     Personal history of other mental and behavioral disorders     History of depression            Past Surgical History:   Procedure Laterality Date    OTHER SURGICAL HISTORY  02/23/2021    No history of surgery       Allergies   Allergen Reactions    Avocado Unknown     avocado    Kieran Unknown     kieran       Medication Documentation Review Audit       Reviewed by Ana Luisa" FABI Luther (Medical Assistant) on 07/24/24 at 1103      Medication Order Taking? Sig Documenting Provider Last Dose Status   sertraline (Zoloft) 50 mg tablet [id number] Yes Take 1 tablet (50 mg) by mouth once daily. LUIS Michael-CNP Taking Active                        Family History   Problem Relation Name Age of Onset    Depression Mother Billie Lombardo     Hypertension Mother Billie Lombardo     Multiple sclerosis Mother Billie Lombardo     No Known Problems Father      Breast cancer Father's Sister Celina Lagos           Social History     Tobacco Use    Smoking status: Never    Smokeless tobacco: Never   Vaping Use    Vaping status: Every Day    Substances: Nicotine, THC   Substance Use Topics    Alcohol use: Yes     Alcohol/week: 2.0 standard drinks of alcohol     Types: 1 Glasses of wine, 1 Standard drinks or equivalent per week     Comment: Sometimes every other week    Drug use: Yes     Types: Marijuana         Review of Systems    REVIEW OF SYSTEMS:  Constitutional: No fatigue  Skin:  endorses intermittent facial rash  Head: No headache, oral ulcers or hair loss  Neck: No difficulty swallowing or choking  Eyes: No dry eyes or iritis  Mouth: No dry mouth  or oral ulcers  Pulmonary: No wheezing, pleurisy or SOB  Cardiovascular: No chest pain or palpitations  Gastrointestinal: No abdominal pain, nausea, heartburn, or blood in stool  Endocrine: positive for raynaud's symptoms  Musculoskeletal: As H/P    All 10 review of systems negative      PHYSICAL EXAM:  Visit Vitals  /68 (BP Location: Right arm, Patient Position: Sitting, BP Cuff Size: Adult)   Pulse 82   Temp 36.9 °C (98.4 °F) (Temporal)     General - NAD, sitting up in chair, well-groomed, pleasant, AAOx3  Head: Normocephalic, atraumatic  Eyes - PERRLA, EOMI. No conjunctiva injection.   Mouth/ENT - Moist oral and nasal mucosa. No facial rash. No enlarged parotid or submandibular gland. Adequate salivary pooling.  Cardiovascular - Normal  "S1, S2. Regular rate and rhythm. No murmurs or rubs.  Lungs - Symmetric chest expansion. Clear to auscultation bilaterally.   Skin - No rashes or ulcers. Skin warm and dry. No erythema on bilateral cheeks.  Abdomen - Soft, non-tender. No masses. Normal bowel sounds.  Extremities - No edema, cyanosis ,or clubbing  Neurological - Alert and oriented x 3,  grossly intact. No focal deficit.  Musculoskeletal -    EXAMJOINTDETAILED,  Shoulders: Full ROM, without pain, no swelling, warmth or tenderness.  Elbows: Full ROM, without pain, no swelling, warmth or tenderness.  Wrists: Full ROM, without swelling or warmth. Right wrist tenderness on palpation. Left wrist positive finkelstein's test.   MCP: No swelling, warmth or tenderness. Metacarpal squeeze negative  PIP: No swelling, warmth or tenderness.  DIP: No swelling, warmth or tenderness.  Hands : 5/5.    Sacroiliac joints: No local tenderness. MAYTE negative.   Hips: Full ROM.  No malalignment.  Knees:  Full ROM, without pain, no swelling, warmth or point tenderness. No joint line tenderness, no pes anserine tenderness.  Ankles: Full ROM, without pain, swelling, warmth or tenderness.  Toes: No swelling, warmth or tenderness. Metatarsal squeeze negative      Lab Results   Component Value Date    WBC 6.1 12/13/2023    HGB 14.6 12/13/2023    HCT 44.5 12/13/2023    MCV 89 12/13/2023     12/13/2023        Chemistry    Lab Results   Component Value Date/Time     12/13/2023 1505    K 4.5 12/13/2023 1505     12/13/2023 1505    CO2 27 12/13/2023 1505    BUN 11 12/13/2023 1505    CREATININE 0.85 12/13/2023 1505    Lab Results   Component Value Date/Time    CALCIUM 9.2 12/13/2023 1505    ALKPHOS 56 12/13/2023 1505    AST 18 12/13/2023 1505    ALT 13 12/13/2023 1505    BILITOT 0.5 12/13/2023 1505           No results found for: \"CRP\"   Lab Results   Component Value Date    RADHA Negative 12/13/2023    RF <10 12/13/2023    SEDRATE 4 12/13/2023      No results " "found for: \"CKTOTAL\"  Lab Results   Component Value Date    NEUTROABS 3.52 12/13/2023      No results found for: \"FERRITIN\"   No results found for: \"HEPATOT\", \"HEPAIGM\", \"HEPBCIGM\", \"HEPBCAB\", \"HBEAG\", \"HEPCAB\"   Lab Results   Component Value Date    ALT 13 12/13/2023    AST 18 12/13/2023    ALKPHOS 56 12/13/2023    BILITOT 0.5 12/13/2023      No results found for: \"PPD\"   Lab Results   Component Value Date    URICACID 3.2 12/13/2023      Lab Results   Component Value Date    CALCIUM 9.2 12/13/2023      No results found for: \"SPEP\", \"UPEP\"   No results found for: \"ALBUR\", \"CUF98YEV\"     No echocardiogram results found for the past 12 months  No DXA results found for the past 12 months     CT ABDOMEN PELVIS W IV CONTRAST  MRN: [id number]  Patient Name: LOMBARDO, ELEXANDRA     STUDY:  CT ABDOMEN AND PELVIS WITH CONTRAST; 10/13/2019 12:55 am     INDICATION:  fever, back pain.     COMPARISON:  10/11/2019     ACCESSION NUMBER(S):  [id number]     ORDERING CLINICIAN:  SELIN MUSA     TECHNIQUE:  Axial CT images of the abdomen and pelvis with coronal and sagittal  reconstructed images obtained after intravenous administration of 93  mL Isovue 370     FINDINGS:  LOWER CHEST: No acute abnormality of the lung bases.  BONES: No acute osseous abnormality.  ABDOMINAL WALL: Within normal limits.     ABDOMEN:     LIVER: Within normal limits.  BILE DUCTS: Normal caliber.  GALLBLADDER: No calcified gallstones. No wall thickening.  PANCREAS: Within normal limits.  SPLEEN: Subcentimeter hypodense lesion in the spleen is stable and  probably a cyst or hemangioma. No perisplenic fluid collection.  ADRENALS: Within normal limits.  KIDNEYS and URETERS: Symmetric renal enhancement. No hydronephrosis  or perinephric fluid collection.        VESSELS: No aortic aneurysm.  RETROPERITONEUM: No pathologically enlarged retroperitoneal lymph  nodes.     PELVIS:     REPRODUCTIVE ORGANS: IUD in the uterus. No free pelvic fluid.  BLADDER: Within " "normal limits.     BOWEL: No dilated bowel.  Normal appendix.  PERITONEUM: No ascites or free air, no fluid collection.     IMPRESSION:  No acute abdominal or pelvic process.        Ms. Lombardo is a 28 y.o. female with PMH mild anxiety on sertraline, post-partum depression, she presents for new patient evaluation.  - she has a had number of symptoms including chronic fatigue symptoms, poor sleep with frequent nighttime awakenings, right wrist pain with numbness, left wrist pain, possible raynaud's and malar rash symptoms  - we reviewed prior autoimmune testing from late last year which showed a normal TSH, CBC, CMP and negative RADHA, and no active joint synovitis or other symptoms to suggest an autoimmune process such as SLE.   - she appears to have an element of carpal tunnel in the right hand, and a positive finkelstein's test on the left hand suggestive of tenosynovitis.     Plan  - we will complete her autoimmune testing with a CRP/ESR  - we provided her with a script for bilateral wrist splints for her carpal tunnel and tenosynovitis       Elexandra \"El\" was seen today for new patient visit.  Diagnoses and all orders for this visit:  Chronic fatigue (Primary)  -     Wrist splint  -     Sedimentation Rate; Future  -     C-Reactive Protein; Future  Carpal tunnel syndrome of right wrist  -     Wrist splint  -     Sedimentation Rate; Future  -     C-Reactive Protein; Future  Tenosynovitis  -     Wrist splint  -     Sedimentation Rate; Future  -     C-Reactive Protein; Future       MD Mary Marion M.D.  Rheumatology Fellow, PGY4  [facility]   "

## 2024-08-02 ENCOUNTER — APPOINTMENT (OUTPATIENT)
Dept: PRIMARY CARE | Facility: CLINIC | Age: 29
End: 2024-08-02
Payer: COMMERCIAL

## 2024-09-17 ENCOUNTER — APPOINTMENT (OUTPATIENT)
Dept: PRIMARY CARE | Facility: CLINIC | Age: 29
End: 2024-09-17
Payer: COMMERCIAL

## 2024-09-17 DIAGNOSIS — F41.1 GENERALIZED ANXIETY DISORDER: Primary | ICD-10-CM

## 2024-09-17 DIAGNOSIS — F32.A DEPRESSION, UNSPECIFIED DEPRESSION TYPE: ICD-10-CM

## 2024-09-17 DIAGNOSIS — G47.00 INSOMNIA, UNSPECIFIED TYPE: ICD-10-CM

## 2024-09-17 PROCEDURE — 99213 OFFICE O/P EST LOW 20 MIN: CPT | Performed by: NURSE PRACTITIONER

## 2024-09-17 PROCEDURE — 1036F TOBACCO NON-USER: CPT | Performed by: NURSE PRACTITIONER

## 2024-09-17 RX ORDER — SERTRALINE HYDROCHLORIDE 50 MG/1
75 TABLET, FILM COATED ORAL DAILY
Qty: 135 TABLET | Refills: 1 | Status: SHIPPED | OUTPATIENT
Start: 2024-09-17

## 2024-09-17 ASSESSMENT — ENCOUNTER SYMPTOMS
DYSPHORIC MOOD: 0
FATIGUE: 0
FEVER: 0
SLEEP DISTURBANCE: 1
NERVOUS/ANXIOUS: 1
CHILLS: 0

## 2024-09-17 ASSESSMENT — PATIENT HEALTH QUESTIONNAIRE - PHQ9
SUM OF ALL RESPONSES TO PHQ9 QUESTIONS 1 AND 2: 0
2. FEELING DOWN, DEPRESSED OR HOPELESS: NOT AT ALL
1. LITTLE INTEREST OR PLEASURE IN DOING THINGS: NOT AT ALL

## 2024-09-18 NOTE — PROGRESS NOTES
"Subjective Elexandra Lombardo \"El\" is a 28 y.o. female who presents for 3 mon fu (On virtual).    An interactive audio and video telecommunication system which permits real time communications between the patient (at the originating site) and provider (at the distant site) was utilized to provide this telehealth service.    Verbal consent was requested and obtained from Elexandra Lombardo for a telehealth visit. All issues as below were discussed and addressed but no physical exam was performed. If it was felt that the patient should be evaluated in the clinic then they were directed there. The patient verbally consented to the visit.  She was located in the [facility] for the visit.       HPI  She presents today virtually for a 3-month follow-up on medications.  She is taking the Sertraline as prescribed. Taking daily in the AM.  No side effects.  Will have nausea at times if does not eat with it.  No feeling sad, down, helpless or hopeless.  She is still emotional but overall better.  Motivation is okay.  Has some days where she does not feel like doing anything.  No SI or HI.  She feels her anxiety is improved.  Less excessive worry.  Less worry about worst case scenarios.  No panic attacks.  Sleep: Falls asleep easily.  Wakes up frequently and mind will race.  At times she will wake up 6 times a night.  She is not sure what is waking her up.  Caffeine use: 1 cup of coffee a day  Alcohol use: None  Drug use: Marijuana 2 nights a week.    Review of Systems   Constitutional:  Negative for chills, fatigue and fever.   Psychiatric/Behavioral:  Positive for sleep disturbance. Negative for dysphoric mood, self-injury and suicidal ideas. The patient is nervous/anxious.      Objective   There were no vitals taken for this visit.    Physical Exam  Constitutional:       General: She is not in acute distress.     Appearance: Normal appearance. She is not toxic-appearing.   Pulmonary:      Effort: Pulmonary effort is " normal. No respiratory distress.   Neurological:      Mental Status: She is alert and oriented to person, place, and time.   Psychiatric:         Mood and Affect: Mood normal.         Behavior: Behavior normal.         Thought Content: Thought content normal.         Judgment: Judgment normal.       Assessment/Plan   Problem List Items Addressed This Visit       Depression     Stable on the current dose of Sertraline.  Continue.         Relevant Medications    sertraline (Zoloft) 50 mg tablet    Generalized anxiety disorder - Primary     Stable on the current dose of Sertraline.  Continue.         Relevant Medications    sertraline (Zoloft) 50 mg tablet     Other Visit Diagnoses       Insomnia, unspecified type        Relevant Orders    Referral to Adult Sleep Medicine            It has been a pleasure seeing you today!

## 2024-09-30 ENCOUNTER — TELEPHONE (OUTPATIENT)
Dept: OBSTETRICS AND GYNECOLOGY | Facility: CLINIC | Age: 29
End: 2024-09-30
Payer: COMMERCIAL

## 2024-09-30 DIAGNOSIS — Z32.01 POSITIVE PREGNANCY TEST (HHS-HCC): ICD-10-CM

## 2024-09-30 NOTE — TELEPHONE ENCOUNTER
Patient is scheduled for a NOB with Dr. Ferrell on 10/21 @ 1:00pm. Her LMP was 9/7.     Patient states that she was a nicotine user, but stopped as soon as she found out she was pregnant, but states that she is really struggling and would like to know if there is anything she could do to try and help with this or what she should try and take. Please advise.

## 2024-10-07 NOTE — TELEPHONE ENCOUNTER
Patient states that she would like to know how to go about getting her first ultrasound and around the dates that are best. She thinks she will only be about 6wks when she comes in, and wasn't sure if that is to early or not.

## 2024-10-08 NOTE — TELEPHONE ENCOUNTER
Returned patient call. Confirmed LMP. Patient with regular monthly periods. This is her 2nd pregnancy. Patient aware of [facility] delivery location. Will schedule OB ultrasound per LMP.

## 2024-10-16 ENCOUNTER — APPOINTMENT (OUTPATIENT)
Dept: OBSTETRICS AND GYNECOLOGY | Facility: CLINIC | Age: 29
End: 2024-10-16
Payer: COMMERCIAL

## 2024-10-18 NOTE — PROGRESS NOTES
Subjective   Patient ID [id number]   El Lombardo is a 28 y.o.  at 6w2d,  with a working estimated date of delivery of 24 by LMP,  who presents for an initial prenatal visit. This pregnancy is planned.  C/o nausea and vomiting  Her pregnancy is uncomplicated         OB History    Para Term  AB Living   2 1 1     1   SAB IAB Ectopic Multiple Live Births           1      # Outcome Date GA Lbr Ky/2nd Weight Sex Type Anes PTL Lv   2 Current            1 Term 17 40w0d   M Vag-Spont EPI  ISAIAH     [place]  Depression Scale Total: 5    Objective   Physical Exam  Weight: 65.1 kg (143 lb 8 oz)  Expected Total Weight Gain: 7 kg (15 lb)-11.5 kg (25 lb)   Pregravid BMI: 26.24  BP: 104/60          Physical Exam  Constitutional:       Appearance: Normal appearance.   Genitourinary:      Vulva normal.   HENT:      Head: Normocephalic.   Cardiovascular:      Rate and Rhythm: Normal rate.      Pulses: Normal pulses.   Pulmonary:      Effort: Pulmonary effort is normal.   Abdominal:      General: Abdomen is flat.      Palpations: Abdomen is soft.   Musculoskeletal:         General: Normal range of motion.      Cervical back: Normal range of motion and neck supple.   Neurological:      General: No focal deficit present.      Mental Status: She is alert and oriented to person, place, and time.   Skin:     General: Skin is warm and dry.   Psychiatric:         Mood and Affect: Mood normal.         Behavior: Behavior normal.         Thought Content: Thought content normal.         Judgment: Judgment normal.         Prenatal Labs  ordered    Assessment/Plan   Problem List Items Addressed This Visit             ICD-10-CM    6 weeks gestation of pregnancy ([facility]) Z3A.01    Nausea and vomiting in pregnancy prior to 22 weeks gestation - Primary O21.9    Relevant Medications    metoclopramide (Reglan) 10 mg tablet     Other Visit Diagnoses         Codes     screening encounter     Z36.9     Relevant Orders    CBC Anemia Panel With Reflex,Pregnancy    Hemoglobin A1C    Hepatitis B Surface Antigen    Hepatitis C Antibody    HIV 1/2 Antigen/Antibody Screen with Reflex to Confirmation    Rubella Antibody, IgG    Syphilis Screen with Reflex    Type And Screen            Immunizations:   Prenatal Labs ordered  Daily prenatal vitamins prescribed  First trimester screening and second trimester screening discussed.   Rx for Reglan done  Follow up in 4 weeks for return OB visit.

## 2024-10-21 ENCOUNTER — APPOINTMENT (OUTPATIENT)
Dept: OBSTETRICS AND GYNECOLOGY | Facility: CLINIC | Age: 29
End: 2024-10-21
Payer: COMMERCIAL

## 2024-10-21 ENCOUNTER — LAB (OUTPATIENT)
Dept: LAB | Facility: LAB | Age: 29
End: 2024-10-21
Payer: COMMERCIAL

## 2024-10-21 VITALS — SYSTOLIC BLOOD PRESSURE: 104 MMHG | BODY MASS INDEX: 26.25 KG/M2 | WEIGHT: 143.5 LBS | DIASTOLIC BLOOD PRESSURE: 60 MMHG

## 2024-10-21 DIAGNOSIS — Z3A.01 6 WEEKS GESTATION OF PREGNANCY (HHS-HCC): ICD-10-CM

## 2024-10-21 DIAGNOSIS — Z11.3 SCREEN FOR STD (SEXUALLY TRANSMITTED DISEASE): ICD-10-CM

## 2024-10-21 DIAGNOSIS — Z36.9 ANTENATAL SCREENING ENCOUNTER: ICD-10-CM

## 2024-10-21 DIAGNOSIS — O21.9 NAUSEA AND VOMITING IN PREGNANCY PRIOR TO 22 WEEKS GESTATION: Primary | ICD-10-CM

## 2024-10-21 DIAGNOSIS — Z11.51 SCREENING FOR HUMAN PAPILLOMAVIRUS (HPV): ICD-10-CM

## 2024-10-21 DIAGNOSIS — Z12.4 SCREENING FOR CERVICAL CANCER: ICD-10-CM

## 2024-10-21 DIAGNOSIS — Z32.01 PREGNANCY TEST POSITIVE (HHS-HCC): ICD-10-CM

## 2024-10-21 LAB
ABO GROUP (TYPE) IN BLOOD: NORMAL
ANTIBODY SCREEN: NORMAL
ERYTHROCYTE [DISTWIDTH] IN BLOOD BY AUTOMATED COUNT: 13.1 % (ref 11.5–14.5)
HCT VFR BLD AUTO: 40.9 % (ref 36–46)
HGB BLD-MCNC: 13.5 G/DL (ref 12–16)
MCH RBC QN AUTO: 28.7 PG (ref 26–34)
MCHC RBC AUTO-ENTMCNC: 33 G/DL (ref 32–36)
MCV RBC AUTO: 87 FL (ref 80–100)
NRBC BLD-RTO: 0 /100 WBCS (ref 0–0)
PLATELET # BLD AUTO: 217 X10*3/UL (ref 150–450)
PREGNANCY TEST URINE, POC: POSITIVE
RBC # BLD AUTO: 4.71 X10*6/UL (ref 4–5.2)
RH FACTOR (ANTIGEN D): NORMAL
WBC # BLD AUTO: 7.3 X10*3/UL (ref 4.4–11.3)

## 2024-10-21 PROCEDURE — 83036 HEMOGLOBIN GLYCOSYLATED A1C: CPT

## 2024-10-21 PROCEDURE — 80307 DRUG TEST PRSMV CHEM ANLYZR: CPT

## 2024-10-21 PROCEDURE — 86317 IMMUNOASSAY INFECTIOUS AGENT: CPT

## 2024-10-21 PROCEDURE — 87491 CHLMYD TRACH DNA AMP PROBE: CPT

## 2024-10-21 PROCEDURE — 87661 TRICHOMONAS VAGINALIS AMPLIF: CPT

## 2024-10-21 PROCEDURE — 86803 HEPATITIS C AB TEST: CPT

## 2024-10-21 PROCEDURE — 36415 COLL VENOUS BLD VENIPUNCTURE: CPT

## 2024-10-21 PROCEDURE — 88175 CYTOPATH C/V AUTO FLUID REDO: CPT

## 2024-10-21 PROCEDURE — 87591 N.GONORRHOEAE DNA AMP PROB: CPT

## 2024-10-21 PROCEDURE — 87389 HIV-1 AG W/HIV-1&-2 AB AG IA: CPT

## 2024-10-21 PROCEDURE — 86901 BLOOD TYPING SEROLOGIC RH(D): CPT

## 2024-10-21 PROCEDURE — 85027 COMPLETE CBC AUTOMATED: CPT

## 2024-10-21 PROCEDURE — H1000 PRENATAL CARE ATRISK ASSESSM: HCPCS | Performed by: OBSTETRICS & GYNECOLOGY

## 2024-10-21 PROCEDURE — 86900 BLOOD TYPING SEROLOGIC ABO: CPT

## 2024-10-21 PROCEDURE — 86780 TREPONEMA PALLIDUM: CPT

## 2024-10-21 PROCEDURE — 87086 URINE CULTURE/COLONY COUNT: CPT

## 2024-10-21 PROCEDURE — 86850 RBC ANTIBODY SCREEN: CPT

## 2024-10-21 PROCEDURE — 87624 HPV HI-RISK TYP POOLED RSLT: CPT

## 2024-10-21 PROCEDURE — 87340 HEPATITIS B SURFACE AG IA: CPT

## 2024-10-21 PROCEDURE — 99214 OFFICE O/P EST MOD 30 MIN: CPT | Performed by: OBSTETRICS & GYNECOLOGY

## 2024-10-21 PROCEDURE — 81025 URINE PREGNANCY TEST: CPT | Performed by: OBSTETRICS & GYNECOLOGY

## 2024-10-21 RX ORDER — METOCLOPRAMIDE 10 MG/1
10 TABLET ORAL ONCE AS NEEDED
Qty: 120 TABLET | Refills: 0 | Status: SHIPPED | OUTPATIENT
Start: 2024-10-21 | End: 2025-02-18

## 2024-10-21 RX ORDER — CYANOCOBALAMIN (VITAMIN B-12) 250 MCG
250 TABLET ORAL DAILY
COMMUNITY

## 2024-10-21 SDOH — ECONOMIC STABILITY: FOOD INSECURITY: WITHIN THE PAST 12 MONTHS, THE FOOD YOU BOUGHT JUST DIDN'T LAST AND YOU DIDN'T HAVE MONEY TO GET MORE.: NEVER TRUE

## 2024-10-21 SDOH — ECONOMIC STABILITY: FOOD INSECURITY: WITHIN THE PAST 12 MONTHS, YOU WORRIED THAT YOUR FOOD WOULD RUN OUT BEFORE YOU GOT MONEY TO BUY MORE.: NEVER TRUE

## 2024-10-21 SDOH — ECONOMIC STABILITY: TRANSPORTATION INSECURITY
IN THE PAST 12 MONTHS, HAS LACK OF TRANSPORTATION KEPT YOU FROM MEETINGS, WORK, OR FROM GETTING THINGS NEEDED FOR DAILY LIVING?: NO

## 2024-10-21 SDOH — HEALTH STABILITY: PHYSICAL HEALTH: ON AVERAGE, HOW MANY MINUTES DO YOU ENGAGE IN EXERCISE AT THIS LEVEL?: 60 MIN

## 2024-10-21 SDOH — ECONOMIC STABILITY: TRANSPORTATION INSECURITY
IN THE PAST 12 MONTHS, HAS THE LACK OF TRANSPORTATION KEPT YOU FROM MEDICAL APPOINTMENTS OR FROM GETTING MEDICATIONS?: NO

## 2024-10-21 SDOH — HEALTH STABILITY: PHYSICAL HEALTH: ON AVERAGE, HOW MANY DAYS PER WEEK DO YOU ENGAGE IN MODERATE TO STRENUOUS EXERCISE (LIKE A BRISK WALK)?: 4 DAYS

## 2024-10-21 ASSESSMENT — LIFESTYLE VARIABLES
SKIP TO QUESTIONS 9-10: 1
HOW MANY STANDARD DRINKS CONTAINING ALCOHOL DO YOU HAVE ON A TYPICAL DAY: PATIENT DOES NOT DRINK
AUDIT-C TOTAL SCORE: 0
HOW OFTEN DO YOU HAVE SIX OR MORE DRINKS ON ONE OCCASION: NEVER
HOW OFTEN DO YOU HAVE A DRINK CONTAINING ALCOHOL: NEVER

## 2024-10-21 ASSESSMENT — SOCIAL DETERMINANTS OF HEALTH (SDOH)
HOW OFTEN DO YOU GET TOGETHER WITH FRIENDS OR RELATIVES?: ONCE A WEEK
WITHIN THE LAST YEAR, HAVE TO BEEN RAPED OR FORCED TO HAVE ANY KIND OF SEXUAL ACTIVITY BY YOUR PARTNER OR EX-PARTNER?: NO
WITHIN THE LAST YEAR, HAVE YOU BEEN AFRAID OF YOUR PARTNER OR EX-PARTNER?: NO
HOW OFTEN DO YOU ATTEND CHURCH OR RELIGIOUS SERVICES?: NEVER
HOW OFTEN DO YOU ATTENT MEETINGS OF THE CLUB OR ORGANIZATION YOU BELONG TO?: NEVER
IN A TYPICAL WEEK, HOW MANY TIMES DO YOU TALK ON THE PHONE WITH FAMILY, FRIENDS, OR NEIGHBORS?: THREE TIMES A WEEK
HOW HARD IS IT FOR YOU TO PAY FOR THE VERY BASICS LIKE FOOD, HOUSING, MEDICAL CARE, AND HEATING?: NOT HARD AT ALL
DO YOU BELONG TO ANY CLUBS OR ORGANIZATIONS SUCH AS CHURCH GROUPS UNIONS, FRATERNAL OR ATHLETIC GROUPS, OR SCHOOL GROUPS?: NO
WITHIN THE LAST YEAR, HAVE YOU BEEN KICKED, HIT, SLAPPED, OR OTHERWISE PHYSICALLY HURT BY YOUR PARTNER OR EX-PARTNER?: NO
WITHIN THE LAST YEAR, HAVE YOU BEEN HUMILIATED OR EMOTIONALLY ABUSED IN OTHER WAYS BY YOUR PARTNER OR EX-PARTNER?: NO

## 2024-10-21 ASSESSMENT — EDINBURGH POSTNATAL DEPRESSION SCALE (EPDS)
I HAVE FELT SAD OR MISERABLE: NO, NOT AT ALL
THINGS HAVE BEEN GETTING ON TOP OF ME: NO, MOST OF THE TIME I HAVE COPED QUITE WELL
TOTAL SCORE: 5
THE THOUGHT OF HARMING MYSELF HAS OCCURRED TO ME: NEVER
I HAVE BEEN ANXIOUS OR WORRIED FOR NO GOOD REASON: YES, SOMETIMES
I HAVE LOOKED FORWARD WITH ENJOYMENT TO THINGS: AS MUCH AS I EVER DID
I HAVE BLAMED MYSELF UNNECESSARILY WHEN THINGS WENT WRONG: NOT VERY OFTEN
I HAVE BEEN SO UNHAPPY THAT I HAVE HAD DIFFICULTY SLEEPING: NOT AT ALL
I HAVE BEEN SO UNHAPPY THAT I HAVE BEEN CRYING: NO, NEVER
I HAVE FELT SCARED OR PANICKY FOR NO GOOD REASON: NO, NOT MUCH
I HAVE BEEN ABLE TO LAUGH AND SEE THE FUNNY SIDE OF THINGS: AS MUCH AS I ALWAYS COULD

## 2024-10-21 ASSESSMENT — PATIENT HEALTH QUESTIONNAIRE - PHQ9
1. LITTLE INTEREST OR PLEASURE IN DOING THINGS: NOT AT ALL
SUM OF ALL RESPONSES TO PHQ9 QUESTIONS 1 & 2: 0
2. FEELING DOWN, DEPRESSED OR HOPELESS: NOT AT ALL

## 2024-10-22 LAB
AMPHETAMINES UR QL SCN: NORMAL
BARBITURATES UR QL SCN: NORMAL
BENZODIAZ UR QL SCN: NORMAL
BZE UR QL SCN: NORMAL
C TRACH RRNA SPEC QL NAA+PROBE: NEGATIVE
CANNABINOIDS UR QL SCN: NORMAL
EST. AVERAGE GLUCOSE BLD GHB EST-MCNC: 105 MG/DL
FENTANYL+NORFENTANYL UR QL SCN: NORMAL
HBA1C MFR BLD: 5.3 %
HBV SURFACE AG SERPL QL IA: NONREACTIVE
HCV AB SER QL: NONREACTIVE
HIV 1+2 AB+HIV1 P24 AG SERPL QL IA: NONREACTIVE
METHADONE UR QL SCN: NORMAL
N GONORRHOEA DNA SPEC QL PROBE+SIG AMP: NEGATIVE
OPIATES UR QL SCN: NORMAL
OXYCODONE+OXYMORPHONE UR QL SCN: NORMAL
PCP UR QL SCN: NORMAL
REFLEX ADDED, ANEMIA PANEL: NORMAL
RUBV IGG SERPL IA-ACNC: 0.9 IA
RUBV IGG SERPL QL IA: NORMAL
T VAGINALIS RRNA SPEC QL NAA+PROBE: NEGATIVE
TREPONEMA PALLIDUM IGG+IGM AB [PRESENCE] IN SERUM OR PLASMA BY IMMUNOASSAY: NONREACTIVE

## 2024-10-23 LAB — BACTERIA UR CULT: NORMAL

## 2024-10-30 ENCOUNTER — HOSPITAL ENCOUNTER (OUTPATIENT)
Dept: RADIOLOGY | Facility: CLINIC | Age: 29
Discharge: HOME | End: 2024-10-30
Payer: COMMERCIAL

## 2024-10-30 DIAGNOSIS — Z32.01 POSITIVE PREGNANCY TEST (HHS-HCC): ICD-10-CM

## 2024-10-30 PROCEDURE — 76801 OB US < 14 WKS SINGLE FETUS: CPT | Performed by: STUDENT IN AN ORGANIZED HEALTH CARE EDUCATION/TRAINING PROGRAM

## 2024-10-30 PROCEDURE — 76801 OB US < 14 WKS SINGLE FETUS: CPT

## 2024-10-31 LAB
CYTOLOGY CMNT CVX/VAG CYTO-IMP: NORMAL
HPV HR 12 DNA GENITAL QL NAA+PROBE: NEGATIVE
HPV HR GENOTYPES PNL CVX NAA+PROBE: NEGATIVE
HPV16 DNA SPEC QL NAA+PROBE: NEGATIVE
HPV18 DNA SPEC QL NAA+PROBE: NEGATIVE
LAB AP HPV GENOTYPE QUESTION: YES
LAB AP HPV HR: NORMAL
LAB AP PAP ADDITIONAL TESTS: NORMAL
LABORATORY COMMENT REPORT: NORMAL
LMP START DATE: NORMAL
MENSTRUAL HX REPORTED: NORMAL
PATH REPORT.TOTAL CANCER: NORMAL

## 2024-11-13 PROBLEM — Z3A.11 11 WEEKS GESTATION OF PREGNANCY (HHS-HCC): Status: ACTIVE | Noted: 2024-10-21

## 2024-11-15 PROBLEM — Z67.91 RH NEGATIVE STATE IN ANTEPARTUM PERIOD (HHS-HCC): Status: ACTIVE | Noted: 2024-11-15

## 2024-11-15 PROBLEM — O26.899 RH NEGATIVE STATE IN ANTEPARTUM PERIOD (HHS-HCC): Status: ACTIVE | Noted: 2024-11-15

## 2024-11-15 NOTE — PROGRESS NOTES
"Subjective   Patient ID [id number]   El Lombardo is a 29 y.o.  at 11w1d,  with a working estimated date of delivery of 25. who presents for a routine prenatal visit. She denies vaginal bleeding, leakage of fluid, decreased fetal movements, and contractions.    Her pregnancy is uncomplicated       Objective   Physical Exam   , Pregravid BMI: 26.24  Expected Total Weight Gain: 7 kg (15 lb)-11.5 kg (25 lb)             Prenatal Labs  Urine dip:  Lab Results   Component Value Date    KETONESU 20 (1+) (A) 10/12/2019     Lab Results   Component Value Date    HGB 13.5 10/21/2024    HCT 40.9 10/21/2024    ABO O 10/21/2024    HEPBSAG Nonreactive 10/21/2024     No results found for: \"PAPPA\", \"AFP\", \"HCG\", \"ESTRIOL\", \"INHBA\"    Imaging  The most recent ultrasound was performed on The most recent ultrasound study is not finalized with a study GA of The most recent ultrasound study is not finalized.  The most recent ultrasound study is not finalized  The most recent ultrasound study is not finalized    Assessment/Plan   Problem List Items Addressed This Visit             ICD-10-CM    Nausea and vomiting in pregnancy prior to 22 weeks gestation O21.9    11 weeks gestation of pregnancy ([facility]) - Primary Z3A.11    Rh negative state in antepartum period ([facility]) O26.899, Z67.91       Continue prenatal vitamin.  Labs reviewed.  Order placed for anatomy scan at 20 weeks.  NIPS ordered  ASCUS Pap with negative high risk HPV, repeat postpartum  Follow up in 4 weeks for a routine prenatal visit.  "

## 2024-11-18 ENCOUNTER — APPOINTMENT (OUTPATIENT)
Dept: LAB | Facility: LAB | Age: 29
End: 2024-11-18
Payer: COMMERCIAL

## 2024-11-18 ENCOUNTER — APPOINTMENT (OUTPATIENT)
Dept: OBSTETRICS AND GYNECOLOGY | Facility: CLINIC | Age: 29
End: 2024-11-18
Payer: COMMERCIAL

## 2024-11-18 VITALS — WEIGHT: 147 LBS | SYSTOLIC BLOOD PRESSURE: 102 MMHG | DIASTOLIC BLOOD PRESSURE: 68 MMHG | BODY MASS INDEX: 26.89 KG/M2

## 2024-11-18 DIAGNOSIS — Z3A.11 11 WEEKS GESTATION OF PREGNANCY (HHS-HCC): Primary | ICD-10-CM

## 2024-11-18 DIAGNOSIS — Z67.91 RH NEGATIVE STATE IN ANTEPARTUM PERIOD (HHS-HCC): ICD-10-CM

## 2024-11-18 DIAGNOSIS — O26.899 RH NEGATIVE STATE IN ANTEPARTUM PERIOD (HHS-HCC): ICD-10-CM

## 2024-11-18 DIAGNOSIS — O21.9 NAUSEA AND VOMITING IN PREGNANCY PRIOR TO 22 WEEKS GESTATION: ICD-10-CM

## 2024-11-18 DIAGNOSIS — Z36.9 ANTENATAL SCREENING ENCOUNTER: ICD-10-CM

## 2024-11-25 ENCOUNTER — HOSPITAL ENCOUNTER (OUTPATIENT)
Dept: RADIOLOGY | Facility: CLINIC | Age: 29
Discharge: HOME | End: 2024-11-25
Payer: COMMERCIAL

## 2024-11-25 DIAGNOSIS — O36.80X0 PREGNANCY WITH INCONCLUSIVE FETAL VIABILITY, NOT APPLICABLE OR UNSPECIFIED: ICD-10-CM

## 2024-11-25 DIAGNOSIS — Z32.01 POSITIVE PREGNANCY TEST (HHS-HCC): ICD-10-CM

## 2024-11-25 DIAGNOSIS — Z36.82 ENCOUNTER FOR ANTENATAL SCREENING FOR NUCHAL TRANSLUCENCY (HHS-HCC): ICD-10-CM

## 2024-11-25 PROCEDURE — 76813 OB US NUCHAL MEAS 1 GEST: CPT

## 2024-11-25 PROCEDURE — 76813 OB US NUCHAL MEAS 1 GEST: CPT | Performed by: OBSTETRICS & GYNECOLOGY

## 2024-11-27 LAB
COMMENTS - MP RESULT TYPE: NORMAL
SCAN RESULT: NORMAL

## 2024-12-02 LAB
COMMENTS - MP RESULT TYPE: NORMAL
SCAN RESULT: NORMAL

## 2024-12-11 PROBLEM — Z3A.15 15 WEEKS GESTATION OF PREGNANCY (HHS-HCC): Status: ACTIVE | Noted: 2024-10-21

## 2024-12-13 PROBLEM — O09.622 YOUNG MULTIGRAVIDA IN SECOND TRIMESTER (HHS-HCC): Status: ACTIVE | Noted: 2024-12-13

## 2024-12-13 NOTE — PROGRESS NOTES
"Subjective   Patient ID [id number]   El Lombardo is a 29 y.o.  at 15w1d,  with a working estimated date of delivery of 25. who presents for a routine prenatal visit. She denies vaginal bleeding, leakage of fluid, decreased fetal movements, and contractions.    Her pregnancy is uncomplicated       Objective   Physical Exam  Weight: 66.2 kg (146 lb), Pregravid BMI: 26.24  Expected Total Weight Gain: 7 kg (15 lb)-11.5 kg (25 lb)   BP: 102/70  Fetal Heart Rate: 141 Fundal Height (cm): 15 cm    Prenatal Labs  Urine dip:  Lab Results   Component Value Date    KETONESU 20 (1+) (A) 10/12/2019     Lab Results   Component Value Date    HGB 13.5 10/21/2024    HCT 40.9 10/21/2024    ABO O 10/21/2024    HEPBSAG Nonreactive 10/21/2024     No results found for: \"PAPPA\", \"AFP\", \"HCG\", \"ESTRIOL\", \"INHBA\"    Imaging  The most recent ultrasound was performed on   with a study GA of  .          Assessment/Plan   Problem List Items Addressed This Visit             ICD-10-CM    Rh negative state in antepartum period ([facility]) O26.899, Z67.91    15 weeks gestation of pregnancy ([facility]) - Primary Z3A.15    Young multigravida in second trimester ([facility]) O09.622     Other Visit Diagnoses         Codes     screening encounter     Z36.9    Relevant Orders    AFP, Maternal Serum            Continue prenatal vitamin.  Labs reviewed.  Order placed for anatomy scan at 20 weeks.  [facility] ordered  ASCUS Pap with negative high risk HPV, repeat in one year  Follow up in 4 weeks for a routine prenatal visit.  "

## 2024-12-16 ENCOUNTER — APPOINTMENT (OUTPATIENT)
Dept: OBSTETRICS AND GYNECOLOGY | Facility: CLINIC | Age: 29
End: 2024-12-16
Payer: COMMERCIAL

## 2024-12-16 ENCOUNTER — LAB (OUTPATIENT)
Dept: LAB | Facility: LAB | Age: 29
End: 2024-12-16
Payer: COMMERCIAL

## 2024-12-16 VITALS — SYSTOLIC BLOOD PRESSURE: 102 MMHG | BODY MASS INDEX: 26.7 KG/M2 | WEIGHT: 146 LBS | DIASTOLIC BLOOD PRESSURE: 70 MMHG

## 2024-12-16 DIAGNOSIS — Z36.9 ANTENATAL SCREENING ENCOUNTER: ICD-10-CM

## 2024-12-16 DIAGNOSIS — O09.622 YOUNG MULTIGRAVIDA IN SECOND TRIMESTER (HHS-HCC): ICD-10-CM

## 2024-12-16 DIAGNOSIS — Z3A.15 15 WEEKS GESTATION OF PREGNANCY (HHS-HCC): Primary | ICD-10-CM

## 2024-12-16 DIAGNOSIS — Z67.91 RH NEGATIVE STATE IN ANTEPARTUM PERIOD (HHS-HCC): ICD-10-CM

## 2024-12-16 DIAGNOSIS — O26.899 RH NEGATIVE STATE IN ANTEPARTUM PERIOD (HHS-HCC): ICD-10-CM

## 2024-12-16 PROCEDURE — 36415 COLL VENOUS BLD VENIPUNCTURE: CPT

## 2024-12-16 PROCEDURE — 82105 ALPHA-FETOPROTEIN SERUM: CPT

## 2024-12-16 PROCEDURE — 99213 OFFICE O/P EST LOW 20 MIN: CPT | Performed by: OBSTETRICS & GYNECOLOGY

## 2024-12-20 LAB
AFP INTERP SERPL-IMP: NORMAL
AFP INTERP SERPL-IMP: NORMAL
AFP MOM SERPL: 0.64
AFP SERPL-MCNC: 19.2 NG/ML
AGE AT DELIVERY: 29.6 YR
COMMENT,AFP MATERNAL SERUM: NORMAL
GA METHOD: NORMAL
GA: 15.1 WEEKS
IDDM PATIENT QL: NO
MULTIPLE PREGNANCY: NO
NEURAL TUBE DEFECT RISK FETUS: NORMAL %
RESULTS, AFP MATERNAL SERUM: NORMAL

## 2025-01-17 PROBLEM — Z3A.20 20 WEEKS GESTATION OF PREGNANCY (HHS-HCC): Status: ACTIVE | Noted: 2024-10-21

## 2025-01-17 NOTE — PROGRESS NOTES
"Subjective   Patient ID [id number]   El Lombardo is a 29 y.o.  at 20w1d,  with a working estimated date of delivery of 25. who presents for a routine prenatal visit. She denies vaginal bleeding, leakage of fluid, decreased fetal movements, and contractions.    Her pregnancy is uncomplicated       Objective   Physical Exam  Weight: 67.1 kg (148 lb), Pregravid BMI: 26.24  Expected Total Weight Gain: 7 kg (15 lb)-11.5 kg (25 lb)   BP: 104/70  Fetal Heart Rate: 143 Fundal Height (cm): 20 cm    Prenatal Labs  Urine dip:  Lab Results   Component Value Date    KETONESU 20 (1+) (A) 10/12/2019     Lab Results   Component Value Date    HGB 13.5 10/21/2024    HCT 40.9 10/21/2024    ABO O 10/21/2024    HEPBSAG Nonreactive 10/21/2024     No results found for: \"PAPPA\", \"AFP\", \"HCG\", \"ESTRIOL\", \"INHBA\"    Imaging  The most recent ultrasound was performed on   with a study GA of  .          Assessment/Plan   Problem List Items Addressed This Visit             ICD-10-CM    Rh negative state in antepartum period ([facility]) O26.899, Z67.91    Young multigravida in second trimester ([facility]) - Primary O09.622    20 weeks gestation of pregnancy ([facility]) Z3A.20         Continue prenatal vitamin.  Labs reviewed, normal MSAFP  Scheduled for anatomy scan on 25  ASCUS Pap with negative high risk HPV, negative colposcopy, repeat in one year  Follow up in 4 weeks for a routine prenatal visit.  "

## 2025-01-20 ENCOUNTER — APPOINTMENT (OUTPATIENT)
Dept: OBSTETRICS AND GYNECOLOGY | Facility: CLINIC | Age: 30
End: 2025-01-20
Payer: COMMERCIAL

## 2025-01-20 VITALS — SYSTOLIC BLOOD PRESSURE: 104 MMHG | BODY MASS INDEX: 27.07 KG/M2 | WEIGHT: 148 LBS | DIASTOLIC BLOOD PRESSURE: 70 MMHG

## 2025-01-20 DIAGNOSIS — Z3A.20 20 WEEKS GESTATION OF PREGNANCY (HHS-HCC): ICD-10-CM

## 2025-01-20 DIAGNOSIS — O09.622 YOUNG MULTIGRAVIDA IN SECOND TRIMESTER (HHS-HCC): Primary | ICD-10-CM

## 2025-01-20 DIAGNOSIS — Z67.91 RH NEGATIVE STATE IN ANTEPARTUM PERIOD (HHS-HCC): ICD-10-CM

## 2025-01-20 DIAGNOSIS — O26.899 RH NEGATIVE STATE IN ANTEPARTUM PERIOD (HHS-HCC): ICD-10-CM

## 2025-01-20 PROBLEM — O21.9 NAUSEA AND VOMITING IN PREGNANCY PRIOR TO 22 WEEKS GESTATION: Status: RESOLVED | Noted: 2024-10-21 | Resolved: 2025-01-20

## 2025-01-20 PROCEDURE — 99213 OFFICE O/P EST LOW 20 MIN: CPT | Performed by: OBSTETRICS & GYNECOLOGY

## 2025-01-22 ENCOUNTER — HOSPITAL ENCOUNTER (OUTPATIENT)
Dept: RADIOLOGY | Facility: CLINIC | Age: 30
Discharge: HOME | End: 2025-01-22
Payer: COMMERCIAL

## 2025-01-22 ENCOUNTER — TELEPHONE (OUTPATIENT)
Dept: OBSTETRICS AND GYNECOLOGY | Facility: CLINIC | Age: 30
End: 2025-01-22
Payer: COMMERCIAL

## 2025-01-22 DIAGNOSIS — R00.2 HEART PALPITATIONS: ICD-10-CM

## 2025-01-22 DIAGNOSIS — Z03.73 ENCOUNTER FOR SUSPECTED FETAL ANOMALY RULED OUT: ICD-10-CM

## 2025-01-22 DIAGNOSIS — Z32.01 POSITIVE PREGNANCY TEST (HHS-HCC): ICD-10-CM

## 2025-01-22 PROCEDURE — 76805 OB US >/= 14 WKS SNGL FETUS: CPT | Performed by: STUDENT IN AN ORGANIZED HEALTH CARE EDUCATION/TRAINING PROGRAM

## 2025-01-22 PROCEDURE — 76805 OB US >/= 14 WKS SNGL FETUS: CPT

## 2025-01-22 NOTE — TELEPHONE ENCOUNTER
Patient is pregnant and states that for the last week she has been having some weird heart beats going on. States she is having irregular heart beats and feels like it is (skipping a beat). Patient does state that when she was younger she was diagnosed with a heart murmer. She said she forgot to mention this heart thing to Dr. Ferrell on Monday when she was here, but it just happened again so she wanted to call. States that it doesn't make her feel any different, but she can definitely feel it happening. Would like a call back because she is not sure what to do. Please advise.    Admission

## 2025-01-22 NOTE — TELEPHONE ENCOUNTER
Returned patient call. Confirmed patient concerns. Patient denies chest pain, denies shortness of breath, and denies dizziness. Advised patient that if she would experience any of those in addition to the heart palpitations then she needs to be evaluated at the ER. Patient knows she is not drinking enough water and had cut back on her caffeine intake. Will update Dr. Ferrell for further recommendations. At this time, advised patient that this could possibly be heart palpitations which are common during pregnancy and are usually harmless, however, Dr. Ferrell may recommend follow up. Advised patient to increase oral water hydration and reviewed when to seek emergency medical attention.

## 2025-02-17 ENCOUNTER — APPOINTMENT (OUTPATIENT)
Dept: OBSTETRICS AND GYNECOLOGY | Facility: CLINIC | Age: 30
End: 2025-02-17
Payer: COMMERCIAL

## 2025-02-24 PROBLEM — Z3A.25 25 WEEKS GESTATION OF PREGNANCY (HHS-HCC): Status: ACTIVE | Noted: 2024-10-21

## 2025-02-26 ENCOUNTER — APPOINTMENT (OUTPATIENT)
Dept: OBSTETRICS AND GYNECOLOGY | Facility: CLINIC | Age: 30
End: 2025-02-26
Payer: COMMERCIAL

## 2025-02-26 DIAGNOSIS — Z67.91 RH NEGATIVE STATE IN ANTEPARTUM PERIOD (HHS-HCC): ICD-10-CM

## 2025-02-26 DIAGNOSIS — O26.899 RH NEGATIVE STATE IN ANTEPARTUM PERIOD (HHS-HCC): ICD-10-CM

## 2025-02-26 DIAGNOSIS — O09.622 YOUNG MULTIGRAVIDA IN SECOND TRIMESTER (HHS-HCC): ICD-10-CM

## 2025-03-03 ENCOUNTER — APPOINTMENT (OUTPATIENT)
Dept: OBSTETRICS AND GYNECOLOGY | Facility: CLINIC | Age: 30
End: 2025-03-03
Payer: COMMERCIAL

## 2025-03-03 ENCOUNTER — APPOINTMENT (OUTPATIENT)
Dept: LAB | Facility: HOSPITAL | Age: 30
End: 2025-03-03
Payer: COMMERCIAL

## 2025-03-03 VITALS — BODY MASS INDEX: 28.39 KG/M2 | DIASTOLIC BLOOD PRESSURE: 70 MMHG | SYSTOLIC BLOOD PRESSURE: 104 MMHG | WEIGHT: 155.2 LBS

## 2025-03-03 DIAGNOSIS — Z67.91 RH NEGATIVE STATE IN ANTEPARTUM PERIOD (HHS-HCC): ICD-10-CM

## 2025-03-03 DIAGNOSIS — Z3A.26 26 WEEKS GESTATION OF PREGNANCY (HHS-HCC): ICD-10-CM

## 2025-03-03 DIAGNOSIS — O09.622 YOUNG MULTIGRAVIDA IN SECOND TRIMESTER (HHS-HCC): Primary | ICD-10-CM

## 2025-03-03 DIAGNOSIS — O26.899 RH NEGATIVE STATE IN ANTEPARTUM PERIOD (HHS-HCC): ICD-10-CM

## 2025-03-03 LAB
ABO GROUP (TYPE) IN BLOOD: NORMAL
ANTIBODY SCREEN: NORMAL
ERYTHROCYTE [DISTWIDTH] IN BLOOD BY AUTOMATED COUNT: 13.9 % (ref 11.5–14.5)
HCT VFR BLD AUTO: 36.1 % (ref 36–46)
HGB BLD-MCNC: 11.8 G/DL (ref 12–16)
MCH RBC QN AUTO: 29.8 PG (ref 26–34)
MCHC RBC AUTO-ENTMCNC: 32.7 G/DL (ref 32–36)
MCV RBC AUTO: 91 FL (ref 80–100)
NRBC BLD-RTO: 0.5 /100 WBCS (ref 0–0)
PLATELET # BLD AUTO: 182 X10*3/UL (ref 150–450)
RBC # BLD AUTO: 3.96 X10*6/UL (ref 4–5.2)
RH FACTOR (ANTIGEN D): NORMAL
WBC # BLD AUTO: 8.3 X10*3/UL (ref 4.4–11.3)

## 2025-03-03 PROCEDURE — 86850 RBC ANTIBODY SCREEN: CPT

## 2025-03-03 PROCEDURE — 86900 BLOOD TYPING SEROLOGIC ABO: CPT

## 2025-03-03 PROCEDURE — 86901 BLOOD TYPING SEROLOGIC RH(D): CPT

## 2025-03-03 PROCEDURE — 85027 COMPLETE CBC AUTOMATED: CPT

## 2025-03-03 PROCEDURE — 36415 COLL VENOUS BLD VENIPUNCTURE: CPT

## 2025-03-03 PROCEDURE — 99213 OFFICE O/P EST LOW 20 MIN: CPT | Performed by: OBSTETRICS & GYNECOLOGY

## 2025-03-03 NOTE — PROGRESS NOTES
"Subjective   Patient ID [id number]   El Lombardo is a 29 y.o.  at 26w1d,  with a working estimated date of delivery of 25. who presents for a routine prenatal visit. She denies vaginal bleeding, leakage of fluid, decreased fetal movements, and contractions.    Her pregnancy is uncomplicated       Objective   Physical Exam  Weight: 70.4 kg (155 lb 3.2 oz), Pregravid BMI: 26.24  Expected Total Weight Gain: 7 kg (15 lb)-11.5 kg (25 lb)   BP: 104/70  Fetal Heart Rate: 144 Fundal Height (cm): 24 cm    Prenatal Labs  Urine dip:  Lab Results   Component Value Date    KETONESU 20 (1+) (A) 10/12/2019     Lab Results   Component Value Date    HGB 13.5 10/21/2024    HCT 40.9 10/21/2024    ABO O 10/21/2024    HEPBSAG Nonreactive 10/21/2024     No results found for: \"PAPPA\", \"AFP\", \"HCG\", \"ESTRIOL\", \"INHBA\"    Imaging  The most recent ultrasound was performed on   with a study GA of  .          Assessment/Plan   Problem List Items Addressed This Visit             ICD-10-CM    Rh negative state in antepartum period ([facility]) O26.899, Z67.91    Young multigravida in second trimester ([facility]) - Primary O09.622    26 weeks gestation of pregnancy ([facility]) Z3A.26           Continue prenatal vitamin.  1 hour GCT and CBC ordered  Discussed Tdap and RhoGAM at next visit  Follow up in 2  weeks for a routine prenatal visit.  "

## 2025-03-04 LAB — REFLEX ADDED, ANEMIA PANEL: NORMAL

## 2025-03-06 LAB
GLUCOSE 1H P 50 G GLC PO SERPL-MCNC: 132 MG/DL
T PALLIDUM AB SER QL IA: NEGATIVE

## 2025-03-14 PROBLEM — Z3A.28 28 WEEKS GESTATION OF PREGNANCY (HHS-HCC): Status: ACTIVE | Noted: 2024-10-21

## 2025-03-14 PROBLEM — Z23 NEED FOR TDAP VACCINATION: Status: ACTIVE | Noted: 2025-03-14

## 2025-03-14 PROBLEM — Z71.85 VACCINE COUNSELING: Status: ACTIVE | Noted: 2025-03-14

## 2025-03-14 PROBLEM — O09.623 YOUNG MULTIGRAVIDA IN THIRD TRIMESTER (HHS-HCC): Status: ACTIVE | Noted: 2024-12-13

## 2025-03-14 NOTE — PROGRESS NOTES
"Subjective   Patient ID [id number]   El Lombardo is a 29 y.o.  at 28w1d,  with a working estimated date of delivery of 25. who presents for a routine prenatal visit. She denies vaginal bleeding, leakage of fluid, decreased fetal movements, and contractions.    Her pregnancy is uncomplicated       Objective   Physical Exam  Weight: 72.1 kg (159 lb), Pregravid BMI: 26.24  Expected Total Weight Gain: 7 kg (15 lb)-11.5 kg (25 lb)   BP: 102/74  Fetal Heart Rate: 138 Fundal Height (cm): 28 cm    Prenatal Labs  Urine dip:  Lab Results   Component Value Date    KETONESU 20 (1+) (A) 10/12/2019     Lab Results   Component Value Date    HGB 11.8 (L) 2025    HCT 36.1 2025    ABO O 2025    HEPBSAG Nonreactive 10/21/2024     No results found for: \"PAPPA\", \"AFP\", \"HCG\", \"ESTRIOL\", \"INHBA\"    Imaging  The most recent ultrasound was performed on   with a study GA of  .          Assessment/Plan   Problem List Items Addressed This Visit             ICD-10-CM    Need for Tdap vaccination Z23    Relevant Orders    Tdap vaccine, age 7 years and older  (BOOSTRIX) (Completed)    Vaccine counseling Z71.85    Relevant Orders    Tdap vaccine, age 7 years and older  (BOOSTRIX) (Completed)    Young multigravida in third trimester ([facility]) O09.623    28 weeks gestation of pregnancy ([facility]) - Primary Z3A.28    Relevant Medications    rho(D) immune globulin (Rhogam) injection 300 mcg (Completed)    Rh negative state in antepartum period ([facility]) O26.899, Z67.91     3/17/25: RHOgam administered         Relevant Medications    rho(D) immune globulin (Rhogam) injection 300 mcg (Completed)             Continue prenatal vitamin.  1 hour GCT and CBC reviewed, normal   Tdap and RhoGAM today  Follow up in 2  weeks for a routine prenatal visit.  "

## 2025-03-17 ENCOUNTER — APPOINTMENT (OUTPATIENT)
Dept: OBSTETRICS AND GYNECOLOGY | Facility: CLINIC | Age: 30
End: 2025-03-17
Payer: COMMERCIAL

## 2025-03-17 VITALS — DIASTOLIC BLOOD PRESSURE: 74 MMHG | SYSTOLIC BLOOD PRESSURE: 102 MMHG | BODY MASS INDEX: 29.08 KG/M2 | WEIGHT: 159 LBS

## 2025-03-17 DIAGNOSIS — Z23 NEED FOR TDAP VACCINATION: ICD-10-CM

## 2025-03-17 DIAGNOSIS — O26.899 RH NEGATIVE STATE IN ANTEPARTUM PERIOD (HHS-HCC): ICD-10-CM

## 2025-03-17 DIAGNOSIS — O09.623 YOUNG MULTIGRAVIDA IN THIRD TRIMESTER (HHS-HCC): ICD-10-CM

## 2025-03-17 DIAGNOSIS — Z67.91 RH NEGATIVE STATE IN ANTEPARTUM PERIOD (HHS-HCC): ICD-10-CM

## 2025-03-17 DIAGNOSIS — Z3A.28 28 WEEKS GESTATION OF PREGNANCY (HHS-HCC): Primary | ICD-10-CM

## 2025-03-17 DIAGNOSIS — Z71.85 VACCINE COUNSELING: ICD-10-CM

## 2025-03-17 PROCEDURE — 90715 TDAP VACCINE 7 YRS/> IM: CPT | Performed by: OBSTETRICS & GYNECOLOGY

## 2025-03-17 PROCEDURE — 99213 OFFICE O/P EST LOW 20 MIN: CPT | Performed by: OBSTETRICS & GYNECOLOGY

## 2025-03-17 PROCEDURE — 96372 THER/PROPH/DIAG INJ SC/IM: CPT | Performed by: OBSTETRICS & GYNECOLOGY

## 2025-03-17 PROCEDURE — 90471 IMMUNIZATION ADMIN: CPT | Performed by: OBSTETRICS & GYNECOLOGY

## 2025-03-24 ENCOUNTER — APPOINTMENT (OUTPATIENT)
Dept: PRIMARY CARE | Facility: CLINIC | Age: 30
End: 2025-03-24
Payer: COMMERCIAL

## 2025-03-24 DIAGNOSIS — F41.1 GENERALIZED ANXIETY DISORDER: Primary | ICD-10-CM

## 2025-03-24 PROCEDURE — 99214 OFFICE O/P EST MOD 30 MIN: CPT | Performed by: NURSE PRACTITIONER

## 2025-03-24 PROCEDURE — 1036F TOBACCO NON-USER: CPT | Performed by: NURSE PRACTITIONER

## 2025-03-24 ASSESSMENT — PATIENT HEALTH QUESTIONNAIRE - PHQ9
SUM OF ALL RESPONSES TO PHQ9 QUESTIONS 1 AND 2: 0
1. LITTLE INTEREST OR PLEASURE IN DOING THINGS: NOT AT ALL
2. FEELING DOWN, DEPRESSED OR HOPELESS: NOT AT ALL

## 2025-03-24 ASSESSMENT — ENCOUNTER SYMPTOMS
FATIGUE: 0
SLEEP DISTURBANCE: 1
NERVOUS/ANXIOUS: 1
FEVER: 0
DYSPHORIC MOOD: 0
CHILLS: 0

## 2025-03-24 NOTE — ASSESSMENT & PLAN NOTE
Will plan to restart Sertraline 25 mg daily for 1 week.  If tolerating, can increase the Sertraline to 50 mg daily.  Plan to follow-up in 6 weeks or sooner if needed.

## 2025-03-24 NOTE — PROGRESS NOTES
"Subjective   Elexandra Lombardo \"El\" is a 29 y.o. female who presents for 6 mon fu virtually.    Virtual or Telephone Consent    An interactive audio and video telecommunication system which permits real time communications between the patient (at the originating site) and provider (at the distant site) was utilized to provide this telehealth service.   Verbal consent was requested and obtained from Elexandra Lombardo on this date, 03/24/25 for a telehealth visit and the patient's location was confirmed at the time of the visit. She was located in the state of [place] for the visit.    HPI  She presents today virtually for a follow-up on anxiety.  She is 29 weeks pregnant.  She reports she stopped the Sertraline about 3 months ago due to nausea during pregnancy.  No feeling sad, down, helpless or hopeless.  Motivation is good now.  Has waxed and waned during pregnancy.  No SI or HI.  No excessive worry  (+) worry about worst case scenarios  No panic attacks.  Reports feeling fidgety and restless.  RLS also flared- advised discussing iron deficiency anemia with OB.  (+) constant mind racing- cannot shut it off.  Sleeping is not great lately due to mind racing.  She feels she needs to restart the Sertraline as she struggled with postpartum depression after having her son.  Diet: Overall pretty healthy  Exercise: Walking and goes to the gym 2 days a week.   Caffeine use: 1 cup of coffee a day  Alcohol use: None  Drug use: None    Review of Systems   Constitutional:  Negative for chills, fatigue and fever.   Psychiatric/Behavioral:  Positive for sleep disturbance. Negative for dysphoric mood, self-injury and suicidal ideas. The patient is nervous/anxious.        Objective   LMP 09/07/2024     Physical Exam  Constitutional:       General: She is not in acute distress.     Appearance: Normal appearance. She is not toxic-appearing.   Pulmonary:      Effort: Pulmonary effort is normal. No respiratory distress.   Neurological:    "   Mental Status: She is alert and oriented to person, place, and time.   Psychiatric:         Mood and Affect: Mood normal.         Behavior: Behavior normal.         Thought Content: Thought content normal.         Judgment: Judgment normal.         Assessment/Plan   Problem List Items Addressed This Visit       Generalized anxiety disorder - Primary     Will plan to restart Sertraline 25 mg daily for 1 week.  If tolerating, can increase the Sertraline to 50 mg daily.  Plan to follow-up in 6 weeks or sooner if needed.          It has been a pleasure seeing you today!

## 2025-03-28 PROBLEM — Z3A.30 30 WEEKS GESTATION OF PREGNANCY (HHS-HCC): Status: ACTIVE | Noted: 2024-10-21

## 2025-03-28 NOTE — ASSESSMENT & PLAN NOTE
Desired provider in labor: [] CNM  [] Physician  [x] Blood Products: [x] Yes, accepts [] No, needs counseling  [x] Initial BMI: 26.24   [x] Prenatal Labs:   [x] Cervical Cancer Screening up to date -P/-H 10/2024  [x] Rh status: NEG  [x] Genetic Screening:  NIPS Negative, Carrier Screen Negative CF+SMA (11/27/24)  [x] NT US: (11-13 wks)  [] Baby ASA (if indicated):  [x] Pregnancy dated by: CRL, selected on 10/30/24    [x] Anatomy US: (19-20 wks) 1/22/25  [] Federal Sterilization consent signed (if indicated):  [x] 1hr GCT at 24-28wks: 132  [x] Rhogam (if indicated): 3/17/25  [] Fetal Surveillance (if indicated):  [x] Tdap (27-32 wks, may be given up to 36 wks if initial window missed): 3/17/25  [] RSV (32-36 wks) (Sept. to end of Jan):   [] Flu Vaccine:    [x] Breastfeeding: education provided  [] Postpartum Birth control method:   [] GBS at 36 - 37 wks:  [] 39 weeks discussion of IOL vs. Expectant management:  [] Mode of delivery ( anticipated ):

## 2025-03-28 NOTE — PROGRESS NOTES
"Subjective   Patient ID [id number]   El Lombardo is a 29 y.o.  at 30w1d,  with a working estimated date of delivery of 25. who presents for a routine prenatal visit. She denies vaginal bleeding, leakage of fluid, decreased fetal movements, and contractions.    Her pregnancy is uncomplicated       Objective   Physical Exam  Weight: 70.8 kg (156 lb), Pregravid BMI: 26.24  Expected Total Weight Gain: 7 kg (15 lb)-11.5 kg (25 lb)   BP: 104/70  Fetal Heart Rate: 150 Fundal Height (cm): 30 cm    Prenatal Labs  Urine dip:  Lab Results   Component Value Date    KETONESU 20 (1+) (A) 10/12/2019     Lab Results   Component Value Date    HGB 11.8 (L) 2025    HCT 36.1 2025    ABO O 2025    HEPBSAG Nonreactive 10/21/2024     No results found for: \"PAPPA\", \"AFP\", \"HCG\", \"ESTRIOL\", \"INHBA\"    Imaging  The most recent ultrasound was performed on   with a study GA of  .          Assessment/Plan   Problem List Items Addressed This Visit             ICD-10-CM    Young multigravida in third trimester ([facility]) - Primary O09.623    Rh negative state in antepartum period ([facility]) O26.899, Z67.91    30 weeks gestation of pregnancy ([facility]) Z3A.30     Desired provider in labor: [] CNM  [] Physician  [x] Blood Products: [x] Yes, accepts [] No, needs counseling  [x] Initial BMI: 26.24   [x] Prenatal Labs:   [x] Cervical Cancer Screening up to date -P/-H 10/2024  [x] Rh status: NEG  [x] Genetic Screening:  NIPS Negative, Carrier Screen Negative CF+SMA (24)  [x] NT US: (11-13 wks)  [] Baby ASA (if indicated):  [x] Pregnancy dated by: CRL, selected on 10/30/24    [x] Anatomy US: (19-20 wks) 25  [] Federal Sterilization consent signed (if indicated):  [x] 1hr GCT at 24-28wks: 132  [x] Rhogam (if indicated): 3/17/25  [] Fetal Surveillance (if indicated):  [x] Tdap (27-32 wks, may be given up to 36 wks if initial window missed): 3/17/25  [] RSV (32-36 wks) (Sept. to end ):   [] Flu Vaccine:    [x] " Breastfeeding: education provided  [] Postpartum Birth control method:   [] GBS at 36 - 37 wks:  [] 39 weeks discussion of IOL vs. Expectant management:  [] Mode of delivery ( anticipated ):              Normal One hour GCT and CBC  Continue prenatal vitamin.  TDAP and rhogam is up to date  Follow up in 2  weeks for a routine prenatal visit.

## 2025-03-31 ENCOUNTER — APPOINTMENT (OUTPATIENT)
Dept: OBSTETRICS AND GYNECOLOGY | Facility: CLINIC | Age: 30
End: 2025-03-31
Payer: COMMERCIAL

## 2025-03-31 VITALS — DIASTOLIC BLOOD PRESSURE: 70 MMHG | WEIGHT: 156 LBS | BODY MASS INDEX: 28.53 KG/M2 | SYSTOLIC BLOOD PRESSURE: 104 MMHG

## 2025-03-31 DIAGNOSIS — Z3A.30 30 WEEKS GESTATION OF PREGNANCY (HHS-HCC): ICD-10-CM

## 2025-03-31 DIAGNOSIS — Z67.91 RH NEGATIVE STATE IN ANTEPARTUM PERIOD (HHS-HCC): ICD-10-CM

## 2025-03-31 DIAGNOSIS — O09.623 YOUNG MULTIGRAVIDA IN THIRD TRIMESTER (HHS-HCC): Primary | ICD-10-CM

## 2025-03-31 DIAGNOSIS — O26.899 RH NEGATIVE STATE IN ANTEPARTUM PERIOD (HHS-HCC): ICD-10-CM

## 2025-03-31 PROCEDURE — 99213 OFFICE O/P EST LOW 20 MIN: CPT | Performed by: OBSTETRICS & GYNECOLOGY

## 2025-04-09 DIAGNOSIS — G25.81 RESTLESS LEG SYNDROME: Primary | ICD-10-CM

## 2025-04-14 ENCOUNTER — APPOINTMENT (OUTPATIENT)
Dept: OBSTETRICS AND GYNECOLOGY | Facility: CLINIC | Age: 30
End: 2025-04-14
Payer: COMMERCIAL

## 2025-04-14 VITALS — BODY MASS INDEX: 29.26 KG/M2 | WEIGHT: 160 LBS | SYSTOLIC BLOOD PRESSURE: 100 MMHG | DIASTOLIC BLOOD PRESSURE: 72 MMHG

## 2025-04-14 DIAGNOSIS — O09.623 YOUNG MULTIGRAVIDA IN THIRD TRIMESTER (HHS-HCC): ICD-10-CM

## 2025-04-14 DIAGNOSIS — G25.81 RESTLESS LEGS SYNDROME WITH NOCTURNAL MYOCLONUS: Primary | ICD-10-CM

## 2025-04-14 DIAGNOSIS — G25.3 RESTLESS LEGS SYNDROME WITH NOCTURNAL MYOCLONUS: Primary | ICD-10-CM

## 2025-04-14 DIAGNOSIS — O26.899 RH NEGATIVE STATE IN ANTEPARTUM PERIOD (HHS-HCC): ICD-10-CM

## 2025-04-14 DIAGNOSIS — Z3A.32 32 WEEKS GESTATION OF PREGNANCY (HHS-HCC): ICD-10-CM

## 2025-04-14 DIAGNOSIS — Z67.91 RH NEGATIVE STATE IN ANTEPARTUM PERIOD (HHS-HCC): ICD-10-CM

## 2025-04-14 PROCEDURE — 99213 OFFICE O/P EST LOW 20 MIN: CPT | Performed by: OBSTETRICS & GYNECOLOGY

## 2025-04-14 RX ORDER — GABAPENTIN 300 MG/1
300 CAPSULE ORAL 3 TIMES DAILY
Qty: 90 CAPSULE | Refills: 11 | OUTPATIENT
Start: 2025-04-14 | End: 2026-04-14

## 2025-04-14 RX ORDER — GABAPENTIN 300 MG/1
300 CAPSULE ORAL ONCE
Status: DISCONTINUED | OUTPATIENT
Start: 2025-04-14 | End: 2025-04-14

## 2025-04-14 NOTE — PROGRESS NOTES
"Subjective   Patient ID [id number]   El Lombardo is a 29 y.o.  at 32w1d,  with a working estimated date of delivery of 25. who presents for a routine prenatal visit. She denies vaginal bleeding, leakage of fluid, decreased fetal movements, and contractions.    Her pregnancy is uncomplicated       Objective   Physical Exam  Weight: 72.6 kg (160 lb), Pregravid BMI: 26.24  Expected Total Weight Gain: 7 kg (15 lb)-11.5 kg (25 lb)   BP: 100/72  Fetal Heart Rate: 155 Fundal Height (cm): 32 cm    Prenatal Labs  Urine dip:  Lab Results   Component Value Date    KETONESU 20 (1+) (A) 10/12/2019     Lab Results   Component Value Date    HGB 11.8 (L) 2025    HCT 36.1 2025    ABO O 2025    HEPBSAG Nonreactive 10/21/2024     No results found for: \"PAPPA\", \"AFP\", \"HCG\", \"ESTRIOL\", \"INHBA\"    Imaging  The most recent ultrasound was performed on   with a study GA of  .          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  TDAP and rhogam is up to date  Follow up in 2  weeks for a routine prenatal visit.  "

## 2025-04-25 NOTE — PROGRESS NOTES
"Subjective   Patient ID [id number]   El Lombardo is a 29 y.o.  at 34w1d,  with a working estimated date of delivery of 25. who presents for a routine prenatal visit. She denies vaginal bleeding, leakage of fluid, decreased fetal movements, and contractions.    Her pregnancy is uncomplicated       Objective   Physical Exam  Weight: 73 kg (161 lb), Pregravid BMI: 26.24  Expected Total Weight Gain: 7 kg (15 lb)-11.5 kg (25 lb)   BP: 102/70  Fetal Heart Rate: 145 Fundal Height (cm): 32 cm    Prenatal Labs  Urine dip:  Lab Results   Component Value Date    KETONESU 20 (1+) (A) 10/12/2019     Lab Results   Component Value Date    HGB 11.8 (L) 2025    HCT 36.1 2025    ABO O 2025    HEPBSAG Nonreactive 10/21/2024     No results found for: \"PAPPA\", \"AFP\", \"HCG\", \"ESTRIOL\", \"INHBA\"    Imaging  The most recent ultrasound was performed on The most recent ultrasound study is not finalized with a study GA of The most recent ultrasound study is not finalized.  The most recent ultrasound study is not finalized  The most recent ultrasound study is not finalized    Assessment/Plan   Problem List Items Addressed This Visit           ICD-10-CM    Young multigravida in third trimester ([facility]) O09.623    Rh negative state in antepartum period ([facility]) O26.899, Z67.91    34 weeks gestation of pregnancy ([facility]) - Primary Z3A.34           Normal One hour GCT and CBC  Continue prenatal vitamin.  TDAP and rhogam is up to date  GBS is due at next visit  Follow up in 2  weeks for a routine prenatal visit.  "

## 2025-04-28 ENCOUNTER — APPOINTMENT (OUTPATIENT)
Dept: OBSTETRICS AND GYNECOLOGY | Facility: CLINIC | Age: 30
End: 2025-04-28
Payer: COMMERCIAL

## 2025-04-28 VITALS — DIASTOLIC BLOOD PRESSURE: 70 MMHG | WEIGHT: 161 LBS | SYSTOLIC BLOOD PRESSURE: 102 MMHG | BODY MASS INDEX: 29.45 KG/M2

## 2025-04-28 DIAGNOSIS — O26.899 RH NEGATIVE STATE IN ANTEPARTUM PERIOD (HHS-HCC): ICD-10-CM

## 2025-04-28 DIAGNOSIS — Z67.91 RH NEGATIVE STATE IN ANTEPARTUM PERIOD (HHS-HCC): ICD-10-CM

## 2025-04-28 DIAGNOSIS — Z3A.34 34 WEEKS GESTATION OF PREGNANCY (HHS-HCC): Primary | ICD-10-CM

## 2025-04-28 DIAGNOSIS — O09.623 YOUNG MULTIGRAVIDA IN THIRD TRIMESTER (HHS-HCC): ICD-10-CM

## 2025-04-28 PROCEDURE — 99213 OFFICE O/P EST LOW 20 MIN: CPT | Performed by: OBSTETRICS & GYNECOLOGY

## 2025-05-01 ENCOUNTER — TELEPHONE (OUTPATIENT)
Dept: OBSTETRICS AND GYNECOLOGY | Facility: CLINIC | Age: 30
End: 2025-05-01
Payer: COMMERCIAL

## 2025-05-01 NOTE — TELEPHONE ENCOUNTER
Patient called into office with complaints of shortness of breath and tingling sensation from her elbows all the way down her hands and a very weird sensation that goes down her neck to her shoulders and wraps around her stomach. She describes this feeling as fatigue or as if a panic attack is about to come on. Patient is short of breath and has an elevated heart rated. Patient is currently 34.4 weeks today and has positive fetal movement. Advised patient to go to [facility] ER for evaluation with her heart and lungs and will notify the on-call physican at [facility] L&D in case she requires further OB monitoring. Patient does have someone to drive her.

## 2025-05-05 ENCOUNTER — APPOINTMENT (OUTPATIENT)
Dept: PRIMARY CARE | Facility: CLINIC | Age: 30
End: 2025-05-05
Payer: COMMERCIAL

## 2025-05-09 NOTE — PROGRESS NOTES
"Subjective   Patient ID [id number]   El Lombardo is a 29 y.o.  at 36w1d,  with a working estimated date of delivery of 25. who presents for a routine prenatal visit. She denies vaginal bleeding, leakage of fluid, decreased fetal movements, and contractions.    Her pregnancy is uncomplicated       Objective   Physical Exam  Weight: 73 kg (161 lb), Pregravid BMI: 26.24  Expected Total Weight Gain: 7 kg (15 lb)-11.5 kg (25 lb)   BP: 96/64  Fetal Heart Rate: 145 Fundal Height (cm): 36 cm    Prenatal Labs  Urine dip:  Lab Results   Component Value Date    KETONESU 20 (1+) (A) 10/12/2019     Lab Results   Component Value Date    HGB 11.8 (L) 2025    HCT 36.1 2025    ABO O 2025    HEPBSAG Nonreactive 10/21/2024     No results found for: \"PAPPA\", \"AFP\", \"HCG\", \"ESTRIOL\", \"INHBA\"    Imaging  The most recent ultrasound was performed on The most recent ultrasound study is not finalized with a study GA of The most recent ultrasound study is not finalized.  The most recent ultrasound study is not finalized  The most recent ultrasound study is not finalized    Assessment/Plan   Problem List Items Addressed This Visit           ICD-10-CM    Young multigravida in third trimester ([facility]) O09.623    Relevant Orders    Group B Streptococcus (GBS) Prenatal Screen, Culture    Rh negative state in antepartum period ([facility]) O26.899, Z67.91    36 weeks gestation of pregnancy ([facility]) - Primary Z3A.36     Other Visit Diagnoses         Codes       screening encounter     Z36.9    Relevant Orders    Group B Streptococcus (GBS) Prenatal Screen, Culture                  Normal One hour GCT and CBC  Continue prenatal vitamin.  TDAP and rhogam is up to date  GBS ordered  Follow up in 1 weeks for a routine prenatal visit.  "

## 2025-05-12 ENCOUNTER — APPOINTMENT (OUTPATIENT)
Dept: OBSTETRICS AND GYNECOLOGY | Facility: CLINIC | Age: 30
End: 2025-05-12
Payer: COMMERCIAL

## 2025-05-12 VITALS — BODY MASS INDEX: 29.45 KG/M2 | DIASTOLIC BLOOD PRESSURE: 64 MMHG | SYSTOLIC BLOOD PRESSURE: 96 MMHG | WEIGHT: 161 LBS

## 2025-05-12 DIAGNOSIS — Z36.9 ANTENATAL SCREENING ENCOUNTER: ICD-10-CM

## 2025-05-12 DIAGNOSIS — Z3A.36 36 WEEKS GESTATION OF PREGNANCY (HHS-HCC): Primary | ICD-10-CM

## 2025-05-12 DIAGNOSIS — O26.899 RH NEGATIVE STATE IN ANTEPARTUM PERIOD (HHS-HCC): ICD-10-CM

## 2025-05-12 DIAGNOSIS — O09.623 YOUNG MULTIGRAVIDA IN THIRD TRIMESTER (HHS-HCC): ICD-10-CM

## 2025-05-12 DIAGNOSIS — Z67.91 RH NEGATIVE STATE IN ANTEPARTUM PERIOD (HHS-HCC): ICD-10-CM

## 2025-05-12 PROCEDURE — 99213 OFFICE O/P EST LOW 20 MIN: CPT | Performed by: OBSTETRICS & GYNECOLOGY

## 2025-05-14 ENCOUNTER — HOSPITAL ENCOUNTER (OUTPATIENT)
Dept: RADIOLOGY | Facility: CLINIC | Age: 30
Discharge: HOME | End: 2025-05-14
Payer: COMMERCIAL

## 2025-05-14 DIAGNOSIS — O26.843 UTERINE SIZE DATE DISCREPANCY PREGNANCY, THIRD TRIMESTER (HHS-HCC): ICD-10-CM

## 2025-05-14 DIAGNOSIS — Z32.01 POSITIVE PREGNANCY TEST (HHS-HCC): ICD-10-CM

## 2025-05-14 PROCEDURE — 76816 OB US FOLLOW-UP PER FETUS: CPT

## 2025-05-15 LAB — GP B STREP SPEC QL CULT: NORMAL

## 2025-05-19 ENCOUNTER — PREP FOR PROCEDURE (OUTPATIENT)
Dept: OBSTETRICS AND GYNECOLOGY | Facility: CLINIC | Age: 30
End: 2025-05-19

## 2025-05-19 ENCOUNTER — APPOINTMENT (OUTPATIENT)
Dept: OBSTETRICS AND GYNECOLOGY | Facility: CLINIC | Age: 30
End: 2025-05-19
Payer: COMMERCIAL

## 2025-05-19 ENCOUNTER — TELEPHONE (OUTPATIENT)
Dept: OBSTETRICS AND GYNECOLOGY | Facility: CLINIC | Age: 30
End: 2025-05-19

## 2025-05-19 VITALS — BODY MASS INDEX: 30 KG/M2 | WEIGHT: 164 LBS | SYSTOLIC BLOOD PRESSURE: 110 MMHG | DIASTOLIC BLOOD PRESSURE: 72 MMHG

## 2025-05-19 DIAGNOSIS — Z3A.37 37 WEEKS GESTATION OF PREGNANCY (HHS-HCC): ICD-10-CM

## 2025-05-19 DIAGNOSIS — O26.899 RH NEGATIVE STATE IN ANTEPARTUM PERIOD (HHS-HCC): ICD-10-CM

## 2025-05-19 DIAGNOSIS — O09.623 YOUNG MULTIGRAVIDA IN THIRD TRIMESTER (HHS-HCC): ICD-10-CM

## 2025-05-19 DIAGNOSIS — Z67.91 RH NEGATIVE STATE IN ANTEPARTUM PERIOD (HHS-HCC): ICD-10-CM

## 2025-05-19 PROCEDURE — 99214 OFFICE O/P EST MOD 30 MIN: CPT | Performed by: OBSTETRICS & GYNECOLOGY

## 2025-05-19 RX ORDER — TERBUTALINE SULFATE 1 MG/ML
0.25 INJECTION SUBCUTANEOUS ONCE
Status: CANCELLED | OUTPATIENT
Start: 2025-05-19 | End: 2025-05-19

## 2025-05-19 NOTE — ASSESSMENT & PLAN NOTE
Desired provider in labor: [] CNM  [] Physician   [] Either Acceptable  [] Blood Products: [] Yes, accepts [] No, needs counseling  [x] Initial BMI: 26.24   [x] Prenatal Labs:   [x] Cervical Cancer Screening up to date  [x] Rh status: negative  [] Screen for IPV and Substance Use Risk:  [x] Genetic Screening (cfDNA):  negative  [x] First Trimester Anatomy Screen (11-13.6 wks): normal  [x] Baby ASA (initiated): 12 weeks  [x] Pregnancy dated by:     [] Anatomy US: (19-20 wks)  [] Federal Sterilization consent signed (if indicated):  [x] 1hr GCT at 24-28wks: normal  [x] Rhogam (if indicated): done  [] Fetal Surveillance (if indicated):  [x] Tdap (27-32 wks, may be given up to 36 wks if initial window missed):   [] RSV (32-36 wks) (Sept. to end of Jan):     [] Feeding Intentions:  [] Postpartum Birth control method:   [x] GBS at 36 - 37 wks: negative  [] 39 weeks discussion of IOL vs. Expectant management:  [] Mode of delivery ( anticipated ):

## 2025-05-19 NOTE — TELEPHONE ENCOUNTER
Called and informed patient that ECV is scheduled for Wednesday 5/21/25 @ 7:30 AM. Patient is not 100% sure she wants the ECV. Encouraged patient to read education sent via [employer] and discuss options with . Patient will return call to office tomorrow.

## 2025-05-19 NOTE — PROGRESS NOTES
"Subjective   Patient ID [id number]   El Lombardo is a 29 y.o.  at 37w1d,  with a working estimated date of delivery of 25. who presents for a routine prenatal visit. She denies vaginal bleeding, leakage of fluid, decreased fetal movements, and contractions.    Her pregnancy is uncomplicated       Objective   Physical Exam  Weight: 74.4 kg (164 lb), Pregravid BMI: 26.24  Expected Total Weight Gain: 7 kg (15 lb)-11.5 kg (25 lb)   BP: 110/72  Fetal Heart Rate: 160 Fundal Height (cm): 37 cm    Prenatal Labs  Urine dip:  Lab Results   Component Value Date    KETONESU 20 (1+) (A) 10/12/2019     Lab Results   Component Value Date    HGB 11.8 (L) 2025    HCT 36.1 2025    ABO O 2025    HEPBSAG Nonreactive 10/21/2024     No results found for: \"PAPPA\", \"AFP\", \"HCG\", \"ESTRIOL\", \"INHBA\"    Imaging  The most recent ultrasound was performed on   with a study GA of  .          Assessment/Plan   Problem List Items Addressed This Visit           ICD-10-CM    Young multigravida in third trimester ([facility]) O09.623    Rh negative state in antepartum period ([facility]) O26.899, Z67.91    Breech presentation ([facility]) - Primary O32.1XX0    37 weeks gestation of pregnancy ([facility]) Z3A.37    Desired provider in labor: [] CNM  [] Physician   [] Either Acceptable  [] Blood Products: [] Yes, accepts [] No, needs counseling  [x] Initial BMI: 26.24   [x] Prenatal Labs:   [x] Cervical Cancer Screening up to date  [x] Rh status: negative  [] Screen for IPV and Substance Use Risk:  [x] Genetic Screening (cfDNA):  negative  [x] First Trimester Anatomy Screen (11-13.6 wks): normal  [x] Baby ASA (initiated): 12 weeks  [x] Pregnancy dated by:     [] Anatomy US: (19-20 wks)  [] Federal Sterilization consent signed (if indicated):  [x] 1hr GCT at 24-28wks: normal  [x] Rhogam (if indicated): done  [] Fetal Surveillance (if indicated):  [x] Tdap (27-32 wks, may be given up to 36 wks if initial window missed):   [] RSV (32-36 wks) " (Sept. to end ):     [] Feeding Intentions:  [] Postpartum Birth control method:   [x] GBS at 36 - 37 wks: negative  [] 39 weeks discussion of IOL vs. Expectant management:  [] Mode of delivery ( anticipated ):                     Normal One hour GCT and CBC  Continue prenatal vitamin.  TDAP and rhogam is up to date  GBS negative  Breech presentation, discussed management options reviewed, External cephalic version or elective  delivery  She desires ECV, risks and benefits reviewed, will schedule at first available date  Follow up in 1 week  for a routine prenatal visit.

## 2025-05-21 ENCOUNTER — ANESTHESIA EVENT (OUTPATIENT)
Dept: OBSTETRICS AND GYNECOLOGY | Facility: HOSPITAL | Age: 30
End: 2025-05-21
Payer: COMMERCIAL

## 2025-05-21 ENCOUNTER — HOSPITAL ENCOUNTER (OUTPATIENT)
Facility: HOSPITAL | Age: 30
Setting detail: OBSERVATION
LOS: 1 days | Discharge: HOME | End: 2025-05-21
Attending: OBSTETRICS & GYNECOLOGY | Admitting: OBSTETRICS & GYNECOLOGY
Payer: COMMERCIAL

## 2025-05-21 ENCOUNTER — TELEPHONE (OUTPATIENT)
Dept: OBSTETRICS AND GYNECOLOGY | Facility: CLINIC | Age: 30
End: 2025-05-21

## 2025-05-21 ENCOUNTER — ANESTHESIA (OUTPATIENT)
Dept: OBSTETRICS AND GYNECOLOGY | Facility: HOSPITAL | Age: 30
End: 2025-05-21
Payer: COMMERCIAL

## 2025-05-21 ENCOUNTER — HOSPITAL ENCOUNTER (OUTPATIENT)
Facility: HOSPITAL | Age: 30
Setting detail: OBSERVATION
End: 2025-05-21
Attending: OBSTETRICS & GYNECOLOGY | Admitting: OBSTETRICS & GYNECOLOGY
Payer: COMMERCIAL

## 2025-05-21 VITALS
WEIGHT: 167.22 LBS | OXYGEN SATURATION: 100 % | TEMPERATURE: 97.3 F | HEIGHT: 61 IN | SYSTOLIC BLOOD PRESSURE: 122 MMHG | HEART RATE: 94 BPM | RESPIRATION RATE: 16 BRPM | BODY MASS INDEX: 31.57 KG/M2 | DIASTOLIC BLOOD PRESSURE: 62 MMHG

## 2025-05-21 DIAGNOSIS — O09.623 YOUNG MULTIGRAVIDA IN THIRD TRIMESTER (HHS-HCC): ICD-10-CM

## 2025-05-21 PROBLEM — Z3A.37 PREGNANCY WITH 37 WEEKS COMPLETED GESTATION (HHS-HCC): Status: ACTIVE | Noted: 2025-05-21

## 2025-05-21 LAB
ABO GROUP (TYPE) IN BLOOD: NORMAL
ANTIBODY SCREEN: NORMAL
BB ANTIBODY IDENTIFICATION: NORMAL
CASE #: NORMAL
ERYTHROCYTE [DISTWIDTH] IN BLOOD BY AUTOMATED COUNT: 13.2 % (ref 11.5–14.5)
HCT VFR BLD AUTO: 31.7 % (ref 36–46)
HGB BLD-MCNC: 10.1 G/DL (ref 12–16)
MCH RBC QN AUTO: 27.5 PG (ref 26–34)
MCHC RBC AUTO-ENTMCNC: 31.9 G/DL (ref 32–36)
MCV RBC AUTO: 86 FL (ref 80–100)
NRBC BLD-RTO: 0 /100 WBCS (ref 0–0)
PLATELET # BLD AUTO: 161 X10*3/UL (ref 150–450)
RBC # BLD AUTO: 3.67 X10*6/UL (ref 4–5.2)
RH FACTOR (ANTIGEN D): NORMAL
WBC # BLD AUTO: 6 X10*3/UL (ref 4.4–11.3)

## 2025-05-21 PROCEDURE — 2500000004 HC RX 250 GENERAL PHARMACY W/ HCPCS (ALT 636 FOR OP/ED): Mod: JZ | Performed by: OBSTETRICS & GYNECOLOGY

## 2025-05-21 PROCEDURE — 86901 BLOOD TYPING SEROLOGIC RH(D): CPT | Performed by: OBSTETRICS & GYNECOLOGY

## 2025-05-21 PROCEDURE — G0378 HOSPITAL OBSERVATION PER HR: HCPCS

## 2025-05-21 PROCEDURE — 3700000014 HC AN EPIDURAL BLOCK CHARGE: Performed by: OBSTETRICS & GYNECOLOGY

## 2025-05-21 PROCEDURE — 85027 COMPLETE CBC AUTOMATED: CPT | Performed by: OBSTETRICS & GYNECOLOGY

## 2025-05-21 PROCEDURE — 59412 ANTEPARTUM MANIPULATION: CPT | Performed by: OBSTETRICS & GYNECOLOGY

## 2025-05-21 PROCEDURE — 7100000016 HC LABOR RECOVERY PER HOUR: Performed by: OBSTETRICS & GYNECOLOGY

## 2025-05-21 PROCEDURE — 86870 RBC ANTIBODY IDENTIFICATION: CPT

## 2025-05-21 PROCEDURE — 51702 INSERT TEMP BLADDER CATH: CPT

## 2025-05-21 PROCEDURE — 59025 FETAL NON-STRESS TEST: CPT | Performed by: OBSTETRICS & GYNECOLOGY

## 2025-05-21 PROCEDURE — 2500000005 HC RX 250 GENERAL PHARMACY W/O HCPCS: Performed by: NURSE ANESTHETIST, CERTIFIED REGISTERED

## 2025-05-21 PROCEDURE — 36415 COLL VENOUS BLD VENIPUNCTURE: CPT | Performed by: OBSTETRICS & GYNECOLOGY

## 2025-05-21 PROCEDURE — 2500000004 HC RX 250 GENERAL PHARMACY W/ HCPCS (ALT 636 FOR OP/ED): Performed by: NURSE ANESTHETIST, CERTIFIED REGISTERED

## 2025-05-21 PROCEDURE — 96372 THER/PROPH/DIAG INJ SC/IM: CPT | Performed by: OBSTETRICS & GYNECOLOGY

## 2025-05-21 RX ORDER — PHENYLEPHRINE HCL IN 0.9% NACL 0.4MG/10ML
SYRINGE (ML) INTRAVENOUS AS NEEDED
Status: DISCONTINUED | OUTPATIENT
Start: 2025-05-21 | End: 2025-05-21

## 2025-05-21 RX ORDER — NORETHINDRONE AND ETHINYL ESTRADIOL 0.5-0.035
KIT ORAL AS NEEDED
Status: DISCONTINUED | OUTPATIENT
Start: 2025-05-21 | End: 2025-05-21

## 2025-05-21 RX ORDER — LIDOCAINE HCL/EPINEPHRINE/PF 2%-1:200K
VIAL (ML) INJECTION AS NEEDED
Status: DISCONTINUED | OUTPATIENT
Start: 2025-05-21 | End: 2025-05-21

## 2025-05-21 RX ORDER — TERBUTALINE SULFATE 1 MG/ML
0.25 INJECTION SUBCUTANEOUS ONCE
Status: COMPLETED | OUTPATIENT
Start: 2025-05-21 | End: 2025-05-21

## 2025-05-21 RX ADMIN — Medication 200 MCG: at 09:54

## 2025-05-21 RX ADMIN — LIDOCAINE HYDROCHLORIDE,EPINEPHRINE BITARTRATE 5 ML: 20; .005 INJECTION, SOLUTION EPIDURAL; INFILTRATION; INTRACAUDAL; PERINEURAL at 09:30

## 2025-05-21 RX ADMIN — HUMAN RHO(D) IMMUNE GLOBULIN 300 MCG: 300 INJECTION, SOLUTION INTRAMUSCULAR at 12:44

## 2025-05-21 RX ADMIN — SODIUM CHLORIDE, POTASSIUM CHLORIDE, SODIUM LACTATE AND CALCIUM CHLORIDE: 600; 310; 30; 20 INJECTION, SOLUTION INTRAVENOUS at 09:58

## 2025-05-21 RX ADMIN — LIDOCAINE HYDROCHLORIDE,EPINEPHRINE BITARTRATE 5 ML: 20; .005 INJECTION, SOLUTION EPIDURAL; INFILTRATION; INTRACAUDAL; PERINEURAL at 09:05

## 2025-05-21 RX ADMIN — TERBUTALINE SULFATE 0.25 MG: 1 INJECTION, SOLUTION SUBCUTANEOUS at 09:34

## 2025-05-21 RX ADMIN — EPHEDRINE SULFATE 5 MG: 50 INJECTION, SOLUTION INTRAVENOUS at 09:51

## 2025-05-21 RX ADMIN — EPHEDRINE SULFATE 15 MG: 50 INJECTION, SOLUTION INTRAVENOUS at 09:54

## 2025-05-21 RX ADMIN — SODIUM CHLORIDE, POTASSIUM CHLORIDE, SODIUM LACTATE AND CALCIUM CHLORIDE: 600; 310; 30; 20 INJECTION, SOLUTION INTRAVENOUS at 09:39

## 2025-05-21 SDOH — SOCIAL STABILITY: SOCIAL INSECURITY: ARE THERE ANY APPARENT SIGNS OF INJURIES/BEHAVIORS THAT COULD BE RELATED TO ABUSE/NEGLECT?: NO

## 2025-05-21 SDOH — HEALTH STABILITY: MENTAL HEALTH: WISH TO BE DEAD (PAST 1 MONTH): NO

## 2025-05-21 SDOH — HEALTH STABILITY: MENTAL HEALTH: SUICIDAL BEHAVIOR (LIFETIME): NO

## 2025-05-21 SDOH — SOCIAL STABILITY: SOCIAL INSECURITY: HAVE YOU HAD THOUGHTS OF HARMING ANYONE ELSE?: NO

## 2025-05-21 SDOH — SOCIAL STABILITY: SOCIAL INSECURITY: ARE YOU OR HAVE YOU BEEN THREATENED OR ABUSED PHYSICALLY, EMOTIONALLY, OR SEXUALLY BY ANYONE?: NO

## 2025-05-21 SDOH — HEALTH STABILITY: MENTAL HEALTH: NON-SPECIFIC ACTIVE SUICIDAL THOUGHTS (PAST 1 MONTH): NO

## 2025-05-21 SDOH — SOCIAL STABILITY: SOCIAL INSECURITY: PHYSICAL ABUSE: DENIES

## 2025-05-21 SDOH — SOCIAL STABILITY: SOCIAL INSECURITY: WITHIN THE LAST YEAR, HAVE YOU BEEN HUMILIATED OR EMOTIONALLY ABUSED IN OTHER WAYS BY YOUR PARTNER OR EX-PARTNER?: NO

## 2025-05-21 SDOH — ECONOMIC STABILITY: FOOD INSECURITY: WITHIN THE PAST 12 MONTHS, THE FOOD YOU BOUGHT JUST DIDN'T LAST AND YOU DIDN'T HAVE MONEY TO GET MORE.: NEVER TRUE

## 2025-05-21 SDOH — SOCIAL STABILITY: SOCIAL INSECURITY
WITHIN THE LAST YEAR, HAVE YOU BEEN KICKED, HIT, SLAPPED, OR OTHERWISE PHYSICALLY HURT BY YOUR PARTNER OR EX-PARTNER?: NO

## 2025-05-21 SDOH — ECONOMIC STABILITY: FOOD INSECURITY: HOW HARD IS IT FOR YOU TO PAY FOR THE VERY BASICS LIKE FOOD, HOUSING, MEDICAL CARE, AND HEATING?: NOT HARD AT ALL

## 2025-05-21 SDOH — ECONOMIC STABILITY: FOOD INSECURITY: WITHIN THE PAST 12 MONTHS, YOU WORRIED THAT YOUR FOOD WOULD RUN OUT BEFORE YOU GOT THE MONEY TO BUY MORE.: NEVER TRUE

## 2025-05-21 SDOH — SOCIAL STABILITY: SOCIAL INSECURITY
WITHIN THE LAST YEAR, HAVE YOU BEEN RAPED OR FORCED TO HAVE ANY KIND OF SEXUAL ACTIVITY BY YOUR PARTNER OR EX-PARTNER?: NO

## 2025-05-21 SDOH — SOCIAL STABILITY: SOCIAL INSECURITY: HAS ANYONE EVER THREATENED TO HURT YOUR FAMILY OR YOUR PETS?: NO

## 2025-05-21 SDOH — HEALTH STABILITY: MENTAL HEALTH: STRENGTHS (MUST CHOOSE TWO): SUPPORT FROM FAMILY;SUPPORT FROM FRIENDS

## 2025-05-21 SDOH — SOCIAL STABILITY: SOCIAL INSECURITY: ABUSE SCREEN: ADULT

## 2025-05-21 SDOH — SOCIAL STABILITY: SOCIAL INSECURITY: WITHIN THE LAST YEAR, HAVE YOU BEEN AFRAID OF YOUR PARTNER OR EX-PARTNER?: NO

## 2025-05-21 SDOH — SOCIAL STABILITY: SOCIAL INSECURITY: HAVE YOU HAD ANY THOUGHTS OF HARMING ANYONE ELSE?: NO

## 2025-05-21 SDOH — SOCIAL STABILITY: SOCIAL INSECURITY: DO YOU FEEL ANYONE HAS EXPLOITED OR TAKEN ADVANTAGE OF YOU FINANCIALLY OR OF YOUR PERSONAL PROPERTY?: NO

## 2025-05-21 SDOH — ECONOMIC STABILITY: HOUSING INSECURITY: DO YOU FEEL UNSAFE GOING BACK TO THE PLACE WHERE YOU ARE LIVING?: NO

## 2025-05-21 SDOH — HEALTH STABILITY: MENTAL HEALTH: CURRENT SMOKER: 0

## 2025-05-21 SDOH — SOCIAL STABILITY: SOCIAL INSECURITY: VERBAL ABUSE: DENIES

## 2025-05-21 SDOH — SOCIAL STABILITY: SOCIAL INSECURITY: DOES ANYONE TRY TO KEEP YOU FROM HAVING/CONTACTING OTHER FRIENDS OR DOING THINGS OUTSIDE YOUR HOME?: NO

## 2025-05-21 SDOH — HEALTH STABILITY: MENTAL HEALTH: WERE YOU ABLE TO COMPLETE ALL THE BEHAVIORAL HEALTH SCREENINGS?: YES

## 2025-05-21 SDOH — ECONOMIC STABILITY: TRANSPORTATION INSECURITY: IN THE PAST 12 MONTHS, HAS LACK OF TRANSPORTATION KEPT YOU FROM MEDICAL APPOINTMENTS OR FROM GETTING MEDICATIONS?: NO

## 2025-05-21 ASSESSMENT — LIFESTYLE VARIABLES
AUDIT-C TOTAL SCORE: 0
AUDIT-C TOTAL SCORE: 0
SKIP TO QUESTIONS 9-10: 1
AUDIT-C TOTAL SCORE: 0
HOW MANY STANDARD DRINKS CONTAINING ALCOHOL DO YOU HAVE ON A TYPICAL DAY: PATIENT DOES NOT DRINK
AUDIT-C TOTAL SCORE: 0
HOW OFTEN DO YOU HAVE A DRINK CONTAINING ALCOHOL: NEVER
HOW OFTEN DO YOU HAVE 6 OR MORE DRINKS ON ONE OCCASION: NEVER
HOW OFTEN DO YOU HAVE 6 OR MORE DRINKS ON ONE OCCASION: NEVER
SKIP TO QUESTIONS 9-10: 1
HOW OFTEN DO YOU HAVE A DRINK CONTAINING ALCOHOL: NEVER
HOW MANY STANDARD DRINKS CONTAINING ALCOHOL DO YOU HAVE ON A TYPICAL DAY: PATIENT DOES NOT DRINK

## 2025-05-21 ASSESSMENT — PAIN SCALES - GENERAL
PAINLEVEL_OUTOF10: 0 - NO PAIN
PAIN_LEVEL: 0
PAINLEVEL_OUTOF10: 0 - NO PAIN

## 2025-05-21 ASSESSMENT — PATIENT HEALTH QUESTIONNAIRE - PHQ9
SUM OF ALL RESPONSES TO PHQ9 QUESTIONS 1 & 2: 0
SUM OF ALL RESPONSES TO PHQ9 QUESTIONS 1 & 2: 0
2. FEELING DOWN, DEPRESSED OR HOPELESS: NOT AT ALL
2. FEELING DOWN, DEPRESSED OR HOPELESS: NOT AT ALL
1. LITTLE INTEREST OR PLEASURE IN DOING THINGS: NOT AT ALL
1. LITTLE INTEREST OR PLEASURE IN DOING THINGS: NOT AT ALL

## 2025-05-21 ASSESSMENT — ACTIVITIES OF DAILY LIVING (ADL)
LACK_OF_TRANSPORTATION: NO
LACK_OF_TRANSPORTATION: NO

## 2025-05-21 NOTE — ANESTHESIA PREPROCEDURE EVALUATION
"Patient: Elexandra Lombardo \"El\"    Evaluation Method: In-person visit    Procedure Information       Date/Time: 05/21/25 0730    Procedure: EXTERNAL CEPHALIC VERSION    Location: [facility] OB 02 / [facility] OB    Surgeons: Shanice Hernández, DO            Relevant Problems   Neuro   (+) Depression   (+) Generalized anxiety disorder      GYN   (+) 37 weeks gestation of pregnancy ([facility])   (+) Young multigravida in third trimester ([facility])       Clinical information reviewed:   Tobacco  Allergies  Meds   Med Hx  Surg Hx   Fam Hx  Soc Hx        NPO Detail:  No data recorded     OB/Gyn Evaluation    Present Pregnancy    Patient is pregnant now.   Obstetric History                Physical Exam    Airway  Mallampati: I  TM distance: >3 FB  Mouth opening: 3 or more finger widths     Cardiovascular   Rhythm: regular  Rate: normal     Dental    Pulmonary Breath sounds clear to auscultation     Abdominal Abdomen: soft             Anesthesia Plan    History of general anesthesia?: no  History of complications of general anesthesia?: no    ASA 2     epidural     The patient is not a current smoker.  Patient was not previously instructed to abstain from smoking on day of procedure.  Patient did not smoke on day of procedure.    Anesthetic plan and risks discussed with patient and spouse.    Plan discussed with attending.        "

## 2025-05-21 NOTE — H&P
OB Admission H&P    Assessment/Plan    Elexandra Lombardo is a 29 y.o.  at 37w3d. ALEJANDRA: 2025, by Ultrasound.     Diagnosis: Breech presentation    Plan  -Admit to Observation L&D, consented  - corticosteroids: not indicated  -Magnesium for fetal neuroprotection: not indicated  -GBS prophylaxis: not indicated    Fetal Status  -NST reactive, reassuring   -Presentation breech based on ultrasound  -EFW 2900 by extrapolation  sono  -GBS Neg      Pregnancy Problems (from 10/21/24 to present)       Problem Noted Diagnosed Resolved    Pregnancy with 37 weeks completed gestation ([facility]) 2025 by Shanice Hernández,   No    Priority:  Medium       Breech presentation ([facility]) 2025 by Sujey Ferrell MD  No    Priority:  Medium       Young multigravida in third trimester ([facility]) 2024 by Sujey Ferrell MD  No    Priority:  Medium       Rh negative state in antepartum period ([facility]) 11/15/2024 by Sujey Ferrell MD  No    Priority:  Medium       37 weeks gestation of pregnancy ([facility]) 10/21/2024 by Sujey Ferrell MD  No    Priority:  Medium       Overview Addendum 2025  3:53 PM by Erna Meek RN   Desired provider in labor: [] CNM  [] Physician   [] Either Acceptable  [x] Blood Products: [x] Yes, accepts [] No, needs counseling  [x] Initial BMI: 26.24   [x] Prenatal Labs:   [x] Cervical Cancer Screening up to date  [x] Rh status: negative  [] Screen for IPV and Substance Use Risk:  [x] Genetic Screening (cfDNA):  Normal  [x] First Trimester Anatomy Screen (11-13.6 wks):  [x] Baby ASA (initiated): yes  [x] Pregnancy dated by: 8 week sUltrasound    [x] Anatomy US: (19-20 wks) 25  [] Federal Sterilization consent signed (if indicated):  [x] 1hr GCT at 24-28wks: yes, normal  [x] Rhogam (if indicated): done  [] Fetal Surveillance (if indicated):  [x] Tdap (27-32 wks, may be given up to 36 wks if initial window missed): done  [] RSV (32-36 wks) (Sept. to end ):      [x] Feeding Intentions: breastfeeding education provided  [] Postpartum Birth control method:   [x] GBS at 36 - 37 wks: negative  [x] 39 weeks discussion of IOL vs. Expectant management: BREECH-ECV scheduled 25 @ 7:30 AM at [facility]  [] Mode of delivery ( anticipated ): undetermined at this time         Nausea and vomiting in pregnancy prior to 22 weeks gestation 10/21/2024 by Sujey Ferrell MD  2025 by Sujey Ferrell MD            Subjective   Good fetal movement.  Denies vaginal bleeding., Denies contractions., Denies leaking of fluid.      Prenatal Provider Mariaelena    OB History    Para Term  AB Living   2 1 1 0 0 1   SAB IAB Ectopic Multiple Live Births   0 0 0 0 1      # Outcome Date GA Lbr Ky/2nd Weight Sex Type Anes PTL Lv   2 Current            1 Term 17 40w0d   M Vag-Spont EPI  ISAIAH       Surgical History[1]    Social History     Tobacco Use    Smoking status: Never    Smokeless tobacco: Never   Substance Use Topics    Alcohol use: Not Currently     Alcohol/week: 2.0 standard drinks of alcohol     Types: 1 Glasses of wine, 1 Standard drinks or equivalent per week     Comment: Sometimes every other week       Allergies[2]    Prescriptions Prior to Admission[3]  Objective     Last Vitals  Temp Pulse Resp BP MAP O2 Sat   36.3 °C (97.3 °F) 95   111/77 88 99 %     Blood Pressures         2025  0751             BP: 111/77             Physical Exam  General: NAD, mood appropriate  Cardiopulmonary: warm and well perfused, breathing comfortably on room air  Abdomen: Gravid, non-tender  Extremities: Symmetric  Speculum Exam: deferred  Cervix: N/E    Chaperone Present: Yes.  Chaperone Name/Title: Idalia MANZO RN  Examination Chaperoned: Entire Physical Exam     Fetal Monitoring  Baseline: 140 bpm, Variability: moderate,  Accelerations: present and Decelerations: none  Uterine Activity: Irregular contractions  Interpretation: Reactive    Bedside ultrasound: Yes    Labs in chart were  reviewed.          Prenatal labs reviewed, not remarkable.             [1]   Past Surgical History:  Procedure Laterality Date    OTHER SURGICAL HISTORY  02/23/2021    No history of surgery   [2]   Allergies  Allergen Reactions    Avocado Nausea/vomiting     avocado    Carmelita Nausea/vomiting     carmelita   [3]   Medications Prior to Admission   Medication Sig Dispense Refill Last Dose/Taking    cyanocobalamin (Vitamin B-12) 250 mcg tablet Take 1 tablet (250 mcg) by mouth once daily.   5/20/2025    MAGNESIUM ORAL Take by mouth.   5/20/2025    prenatal vit no.124/iron/folic (PRENATAL VITAMIN ORAL) Take by mouth.   5/21/2025 Morning    sertraline (Zoloft) 50 mg tablet Take 1.5 tablets (75 mg) by mouth once daily. 135 tablet 1 More than a month

## 2025-05-21 NOTE — TELEPHONE ENCOUNTER
"Patient called stating she had an \"Aversion\" done today and it was successful.  Head is down.  She was told that if this were to happen that she would get scheduled for her induction.  She has an appointment on Monday, May 29th should she wait until then?  Please advise and let her know what she should do.  "

## 2025-05-21 NOTE — CARE PLAN
The patient's goals for the shift include  free from injury     The clinical goals for the shift include Successful Eversion      Problem: Antepartum  Goal: Maintain pregnancy as long as maternal and/or fetal condition is stable  Outcome: Met  Goal: FHR remains reassuring  Outcome: Met  Goal: Minimize anxiety/maximize coping  Outcome: Met

## 2025-05-21 NOTE — ANESTHESIA PROCEDURE NOTES
Epidural Block    Start time: 5/21/2025 8:53 AM  End time: 5/21/2025 9:02 AM  Reason for block: primary anesthetic  Staffing  Performed: CRNA   Authorized by: JANEEN Borjas    Performed by: JANEEN Bojras    Preanesthetic Checklist  Completed: patient identified, IV checked, risks and benefits discussed, surgical consent, pre-op evaluation, timeout performed and sterile techniques followed  Block Timeout  RN/Licensed healthcare professional reads aloud to the Anesthesia provider and entire team: Patient identity, procedure with side and site, patient position, and as applicable the availability of implants/special equipment/special requirements.  Patient on coagulant treatment: no  Timeout performed at: 5/21/2025 8:46 AM  Block Placement  Patient position: sitting  Prep: ChloraPrep  Sterility prep: cap, drape, gloves and mask  Sedation level: no sedation  Patient monitoring: blood pressure, continuous pulse oximetry and heart rate  Approach: midline  Local numbing: lidocaine 1% to skin and subcutaneous tissues  Vertebral space: lumbar  Lumbar location: L3-L4  Epidural  Loss of resistance technique: saline  Guidance: landmark technique        Needle  Needle type: Tuohy   Needle gauge: 17  Needle length: 9 cm  Needle insertion depth: 4 cm  Catheter type: multi-orifice  Catheter size: 19 G  Catheter at skin depth: 9 cm  Catheter securement method: clear occlusive dressing, liquid medical adhesive and surgical tape    Test dose: lidocaine 1.5% with epinephrine 1-to-200,000  Test dose: lidocaine 1.5% with epinephrine 1-to-200,000  Test dose result: no positive test dose            Assessment  Block outcome: patient comfortable  Number of attempts: 1  Events: no positive test dose  Procedure assessment: patient tolerated procedure well with no immediate complications

## 2025-05-21 NOTE — OP NOTE
"Date: 2025  OR Location: [facility] OB    Name: Elexandra Lombardo \"El\", : 1995, Age: 29 y.o., MRN: [id number], Sex: female    Diagnosis  Pre-op Diagnosis      * Breech presentation, single or unspecified fetus ([facility]) [O32.1XX0] Post-op Diagnosis     * Breech presentation, single or unspecified fetus ([facility]) [O32.1XX0]     Procedures  EXTERNAL CEPHALIC VERSION  01850 - [place] EXTERNAL CEPHALIC VERSION W/WO TOCOLYSIS      Surgeons      * Shanice Hernández - Primary    Resident/Fellow/Other Assistant:  Surgeons and Role:     * CRISTIAN Dodd - Assisting    Staff:   Circulator: Chelsie  Circulator: Alyx    Anesthesia Staff: CRNA: LUIS Borjas-CRNA    Procedure Summary  Anesthesia: Epidural  ASA: II  Estimated Blood Loss: 0mL  Intra-op Medications:   Administrations occurring from 0731 to 1004 on 25:   Medication Name Total Dose   lactated Ringer's bolus 500 mL Cannot be calculated   terbutaline (Brethine) injection 0.25 mg 0.25 mg              Anesthesia Record               Intraprocedure I/O Totals          Intake    lactated Ringer's bolus 500 mL 1100.00 mL    Total Intake 1100 mL       Output    Urine 100 mL    Total Output 100 mL       Net    Net Volume 1000 mL          Specimen: No specimens collected              Procedure Details:  The patient was seen in the preoperative area. The site of surgery was properly noted/marked if necessary per policy. The patient has been actively warmed in preoperative area. Preoperative antibiotics are not indicated. Venous thrombosis prophylaxis have been ordered including bilateral sequential compression devices  Epidural anesthesia was placed and found to be adequate. A Pinto was placed and the patient was transferred to the OR. Timeout was done.    Pt was placed in supine position with a left tilt. Terbutaline was given. BSUS confirmed fetal head midline fundal with back to patients right and breech in the pelvis. The breech was elevated out " of the pelvis and a front roll was performed bringing the fetal head along the left side. This was carefully guided towards the pelvis until cephalic position confirmed. Fetal deceleration to 60 for 60 seconds resolved with position changes.     Fetal heart rate returned to baseline of 140 with accels. At this time decision was made to transfer patient to recovery area where she will be monitored for 2 hours. Cephalic position was confirmed prior to leaving the OR.    Pt tolerated well.    Findings: Viable fetus in breech position    Complications:  None; patient tolerated the procedure well.     Disposition: PACU - hemodynamically stable.  Condition: stable

## 2025-05-21 NOTE — PROGRESS NOTES
Patient: El Lombardo    Vitals Value Taken Time   /62 05/21/25 12:53   Temp 36.3 °C (97.3 °F) 05/21/25 12:54   Pulse 94 05/21/25 12:53   Resp 16 05/21/25 11:49   SpO2 100 % 05/21/25 12:06     Epidural catheter removed by nursing. No redness, swelling, or drainage at puncture site.    Complete resolution of numbness. Patient is able to lift legs, bend at the knees, and ambulate.    Patient denies problems with urination.    Patient denies nausea, headache or severe back pain.   [unfilled]

## 2025-05-21 NOTE — DISCHARGE SUMMARY
Discharge Summary    Admission Date: 5/21/2025  Discharge Date: 5/21/2025    Discharge Diagnosis  Breech presentation ([facility])         Procedures: external cephalic version  Contraception at discharge: none      Pertinent Physical Exam At Time of Discharge  GENERAL: Examination reveals a well developed, well nourished, gravid female in no acute distress. She is alert and cooperative.  FHR is 137 BPM, with Accelerations, Variable decelerations, and a   tracing.    [place] reading: rare contraction   The fetus is in a vertex presentation, determined by ultrasound  EXTREMITIES: no redness or tenderness in the calves or thighs, no edema  NEUROLOGICAL: alert, oriented, normal speech, no focal findings or movement disorder noted  PSYCHOLOGICAL: awake and alert; oriented to person, place, and time    Pt felt large movement post procedure and was concerned baby had reverted to breech position. BSUS done and baby remains vertex. Practice breathing noted during eval. Pt is feeling good movement.    Last Vitals:  Temp Pulse Resp BP MAP Pulse Ox   36.6 °C (97.9 °F) 92 16 130/74 95 100 %     Discharge Meds     Your medication list        START taking these medications        Instructions Last Dose Given Next Dose Due   gabapentin 300 mg capsule  Commonly known as: Neurontin      Take 1 capsule (300 mg) by mouth 3 times a day.              CONTINUE taking these medications        Instructions Last Dose Given Next Dose Due   cyanocobalamin 250 mcg tablet  Commonly known as: Vitamin B-12           MAGNESIUM ORAL           PRENATAL VITAMIN ORAL           sertraline 50 mg tablet  Commonly known as: Zoloft      Take 1.5 tablets (75 mg) by mouth once daily.                 Where to Get Your Medications        These medications were sent to [id number] - [place], [place] - [address]  [address] [address]      Phone: [phone]   gabapentin 300 mg capsule          Complications Requiring Follow-Up  None    Test Results  Pending At Discharge  Pending Labs       No current pending labs.            Outpatient Follow-Up  Future Appointments   Date Time Provider Department Center   5/29/2025 11:50 AM Sujey Ferrell MD [id number] [facility]   6/2/2025 11:30 AM MD Cristian [email or web address] [facility]   6/9/2025 11:10 AM Sujey Ferrell MD [id number] [facility]             Shanice Hernández DO

## 2025-05-21 NOTE — ANESTHESIA POSTPROCEDURE EVALUATION
"Patient: Elexandra Lombardo \"El\"    Procedure Summary       Date: 05/21/25 Room / Location: [facility] OB 02 / [facility]    Anesthesia Start: 0900 Anesthesia Stop: 1019    Procedure: EXTERNAL CEPHALIC VERSION Diagnosis:       Breech presentation, single or unspecified fetus ([facility])      (Breech presentation, single or unspecified fetus ([facility]) [O32.1XX0])    Surgeons: Shanice Hernández DO Responsible Provider: JANEEN Borjas    Anesthesia Type: epidural ASA Status: 2            Anesthesia Type: epidural    Vitals Value Taken Time   /59 05/21/25 10:19   Temp 35.9 °C (96.6 °F) 05/21/25 10:08   Pulse 126 05/21/25 10:19   Resp 20 05/21/25 10:19   SpO2 100 % 05/21/25 10:16       Anesthesia Post Evaluation    Patient location during evaluation: bedside  Patient participation: complete - patient participated  Level of consciousness: awake and alert  Pain score: 0  Pain management: adequate  Airway patency: patent  Cardiovascular status: acceptable  Respiratory status: acceptable  Hydration status: acceptable  Postoperative Nausea and Vomiting: none        No notable events documented.    "

## 2025-05-22 NOTE — TELEPHONE ENCOUNTER
Returned patient call. Patient informed that we can call scheduling and see what is available starting on Monday 6/2. Patient aware of induction times. Will call and see what is available and inform patient when scheduled.

## 2025-05-23 NOTE — PROGRESS NOTES
"Subjective   Patient ID [id number]   El Lombardo is a 29 y.o.  at 38w4d,  with a working estimated date of delivery of 25. who presents for a routine prenatal visit. She denies vaginal bleeding, leakage of fluid, decreased fetal movements, and contractions.  S/p successful ECV last week  Her pregnancy is uncomplicated       Objective   Physical Exam  Weight: 75.2 kg (165 lb 12.8 oz), Pregravid BMI: 27.13  Expected Total Weight Gain: 7 kg (15 lb)-11.5 kg (25 lb)   BP: 110/70  Fetal Heart Rate: 130 Fundal Height (cm): 38 cm    Prenatal Labs  Urine dip:  Lab Results   Component Value Date    KETONESU 20 (1+) (A) 10/12/2019     Lab Results   Component Value Date    HGB 10.1 (L) 2025    HCT 31.7 (L) 2025    ABO O 2025    HEPBSAG Nonreactive 10/21/2024     No results found for: \"PAPPA\", \"AFP\", \"HCG\", \"ESTRIOL\", \"INHBA\"    Imaging  The most recent ultrasound was performed on   with a study GA of  .          Assessment/Plan   Problem List Items Addressed This Visit           ICD-10-CM    Young multigravida in third trimester ([facility]) O09.623    Rh negative state in antepartum period ([facility]) - Primary O26.899, Z67.91    38 weeks gestation of pregnancy ([facility]) Z3A.38    Desired provider in labor: [] CNM  [] Physician   [] Either Acceptable  [x] Blood Products: [x] Yes, accepts [] No, needs counseling  [x] Initial BMI: 26.24   [x] Prenatal Labs:   [x] Cervical Cancer Screening up to date  [x] Rh status: negative  [] Screen for IPV and Substance Use Risk:  [x] Genetic Screening (cfDNA):  Normal  [x] First Trimester Anatomy Screen (11-13.6 wks):  [x] Baby ASA (initiated): yes  [x] Pregnancy dated by: 8 week sUltrasound    [x] Anatomy US: (19-20 wks) 25  [] Federal Sterilization consent signed (if indicated):  [x] 1hr GCT at 24-28wks: yes, normal  [x] Rhogam (if indicated): done  [] Fetal Surveillance (if indicated):  [x] Tdap (27-32 wks, may be given up to 36 wks if initial window missed): " done  [] RSV (32-36 wks) (Sept. to end of Jan):     [x] Feeding Intentions: breastfeeding education provided  [] Postpartum Birth control method:   [x] GBS at 36 - 37 wks: negative  [x] 39 weeks discussion of IOL vs. Expectant management: S/p successful ECV on 5/21/25, vertex now. Scheduled for IOL on 6/2/25  [] Mode of delivery ( anticipated ): vaginal                      Normal One hour GCT and CBC  Continue prenatal vitamin.  TDAP and rhogam is up to date  GBS negative  S/p successful ECV , vertex now  Scheduled for IOL on 6/2/25  Follow up for postpartum visit

## 2025-05-29 ENCOUNTER — APPOINTMENT (OUTPATIENT)
Dept: OBSTETRICS AND GYNECOLOGY | Facility: CLINIC | Age: 30
End: 2025-05-29
Payer: COMMERCIAL

## 2025-05-29 VITALS — BODY MASS INDEX: 31.33 KG/M2 | SYSTOLIC BLOOD PRESSURE: 110 MMHG | DIASTOLIC BLOOD PRESSURE: 70 MMHG | WEIGHT: 165.8 LBS

## 2025-05-29 DIAGNOSIS — Z67.91 RH NEGATIVE STATE IN ANTEPARTUM PERIOD (HHS-HCC): Primary | ICD-10-CM

## 2025-05-29 DIAGNOSIS — O26.899 RH NEGATIVE STATE IN ANTEPARTUM PERIOD (HHS-HCC): Primary | ICD-10-CM

## 2025-05-29 DIAGNOSIS — O09.623 YOUNG MULTIGRAVIDA IN THIRD TRIMESTER (HHS-HCC): ICD-10-CM

## 2025-05-29 DIAGNOSIS — Z3A.38 38 WEEKS GESTATION OF PREGNANCY (HHS-HCC): ICD-10-CM

## 2025-05-29 PROBLEM — Z3A.37 PREGNANCY WITH 37 WEEKS COMPLETED GESTATION (HHS-HCC): Status: RESOLVED | Noted: 2025-05-21 | Resolved: 2025-05-29

## 2025-05-29 PROCEDURE — 99213 OFFICE O/P EST LOW 20 MIN: CPT | Performed by: OBSTETRICS & GYNECOLOGY

## 2025-05-29 NOTE — ASSESSMENT & PLAN NOTE
Desired provider in labor: [] CNM  [] Physician   [] Either Acceptable  [x] Blood Products: [x] Yes, accepts [] No, needs counseling  [x] Initial BMI: 26.24   [x] Prenatal Labs:   [x] Cervical Cancer Screening up to date  [x] Rh status: negative  [] Screen for IPV and Substance Use Risk:  [x] Genetic Screening (cfDNA):  Normal  [x] First Trimester Anatomy Screen (11-13.6 wks):  [x] Baby ASA (initiated): yes  [x] Pregnancy dated by: 8 week sUltrasound    [x] Anatomy US: (19-20 wks) 1/22/25  [] Federal Sterilization consent signed (if indicated):  [x] 1hr GCT at 24-28wks: yes, normal  [x] Rhogam (if indicated): done  [] Fetal Surveillance (if indicated):  [x] Tdap (27-32 wks, may be given up to 36 wks if initial window missed): done  [] RSV (32-36 wks) (Sept. to end of Jan):     [x] Feeding Intentions: breastfeeding education provided  [] Postpartum Birth control method:   [x] GBS at 36 - 37 wks: negative  [x] 39 weeks discussion of IOL vs. Expectant management: S/p successful ECV on 5/21/25, vertex now. Scheduled for IOL on 6/2/25  [] Mode of delivery ( anticipated ): vaginal

## 2025-06-02 ENCOUNTER — APPOINTMENT (OUTPATIENT)
Dept: OBSTETRICS AND GYNECOLOGY | Facility: HOSPITAL | Age: 30
End: 2025-06-02
Payer: COMMERCIAL

## 2025-06-02 ENCOUNTER — HOSPITAL ENCOUNTER (INPATIENT)
Facility: HOSPITAL | Age: 30
LOS: 3 days | Discharge: HOME | End: 2025-06-05
Attending: OBSTETRICS & GYNECOLOGY | Admitting: OBSTETRICS & GYNECOLOGY
Payer: COMMERCIAL

## 2025-06-02 ENCOUNTER — APPOINTMENT (OUTPATIENT)
Dept: OBSTETRICS AND GYNECOLOGY | Facility: CLINIC | Age: 30
End: 2025-06-02
Payer: COMMERCIAL

## 2025-06-02 DIAGNOSIS — O09.623 YOUNG MULTIGRAVIDA IN THIRD TRIMESTER (HHS-HCC): ICD-10-CM

## 2025-06-02 PROBLEM — Z23 NEED FOR TDAP VACCINATION: Status: RESOLVED | Noted: 2025-03-14 | Resolved: 2025-06-02

## 2025-06-02 PROBLEM — Z3A.39 PREGNANCY WITH 39 COMPLETED WEEKS GESTATION (HHS-HCC): Status: ACTIVE | Noted: 2025-06-02

## 2025-06-02 PROBLEM — Z01.419 ENCOUNTER FOR WELL WOMAN EXAM WITH ROUTINE GYNECOLOGICAL EXAM: Status: RESOLVED | Noted: 2024-06-11 | Resolved: 2025-06-02

## 2025-06-02 PROBLEM — Z71.85 VACCINE COUNSELING: Status: RESOLVED | Noted: 2025-03-14 | Resolved: 2025-06-02

## 2025-06-02 LAB
ABO GROUP (TYPE) IN BLOOD: NORMAL
ANTIBODY SCREEN: NORMAL
BB ANTIBODY IDENTIFICATION: NORMAL
CASE #: NORMAL
ERYTHROCYTE [DISTWIDTH] IN BLOOD BY AUTOMATED COUNT: 13.7 % (ref 11.5–14.5)
HCT VFR BLD AUTO: 31.9 % (ref 36–46)
HGB BLD-MCNC: 10 G/DL (ref 12–16)
MCH RBC QN AUTO: 27.5 PG (ref 26–34)
MCHC RBC AUTO-ENTMCNC: 31.3 G/DL (ref 32–36)
MCV RBC AUTO: 88 FL (ref 80–100)
NRBC BLD-RTO: 0 /100 WBCS (ref 0–0)
PLATELET # BLD AUTO: 170 X10*3/UL (ref 150–450)
RBC # BLD AUTO: 3.63 X10*6/UL (ref 4–5.2)
RH FACTOR (ANTIGEN D): NORMAL
WBC # BLD AUTO: 6.7 X10*3/UL (ref 4.4–11.3)

## 2025-06-02 PROCEDURE — 36415 COLL VENOUS BLD VENIPUNCTURE: CPT | Performed by: OBSTETRICS & GYNECOLOGY

## 2025-06-02 PROCEDURE — 2500000001 HC RX 250 WO HCPCS SELF ADMINISTERED DRUGS (ALT 637 FOR MEDICARE OP): Performed by: ADVANCED PRACTICE MIDWIFE

## 2025-06-02 PROCEDURE — 86780 TREPONEMA PALLIDUM: CPT | Mod: AHULAB | Performed by: OBSTETRICS & GYNECOLOGY

## 2025-06-02 PROCEDURE — 1120000001 HC OB PRIVATE ROOM DAILY

## 2025-06-02 PROCEDURE — 86870 RBC ANTIBODY IDENTIFICATION: CPT

## 2025-06-02 PROCEDURE — 85027 COMPLETE CBC AUTOMATED: CPT | Performed by: OBSTETRICS & GYNECOLOGY

## 2025-06-02 PROCEDURE — 86850 RBC ANTIBODY SCREEN: CPT | Performed by: OBSTETRICS & GYNECOLOGY

## 2025-06-02 PROCEDURE — 0U7C7DJ DILATION OF CERVIX WITH INTRALUM DEV, TEMP, VIA OPENING: ICD-10-PCS | Performed by: ADVANCED PRACTICE MIDWIFE

## 2025-06-02 PROCEDURE — 3E0P7VZ INTRODUCTION OF HORMONE INTO FEMALE REPRODUCTIVE, VIA NATURAL OR ARTIFICIAL OPENING: ICD-10-PCS | Performed by: ADVANCED PRACTICE MIDWIFE

## 2025-06-02 PROCEDURE — 99223 1ST HOSP IP/OBS HIGH 75: CPT | Performed by: ADVANCED PRACTICE MIDWIFE

## 2025-06-02 PROCEDURE — 86901 BLOOD TYPING SEROLOGIC RH(D): CPT | Performed by: OBSTETRICS & GYNECOLOGY

## 2025-06-02 PROCEDURE — 86900 BLOOD TYPING SEROLOGIC ABO: CPT | Performed by: OBSTETRICS & GYNECOLOGY

## 2025-06-02 RX ORDER — CALCIUM CARBONATE 200(500)MG
1 TABLET,CHEWABLE ORAL EVERY 6 HOURS PRN
Status: DISCONTINUED | OUTPATIENT
Start: 2025-06-02 | End: 2025-06-03

## 2025-06-02 RX ORDER — LIDOCAINE HYDROCHLORIDE 10 MG/ML
20 INJECTION, SOLUTION INFILTRATION; PERINEURAL ONCE AS NEEDED
Status: DISCONTINUED | OUTPATIENT
Start: 2025-06-02 | End: 2025-06-03

## 2025-06-02 RX ORDER — CARBOPROST TROMETHAMINE 250 UG/ML
250 INJECTION, SOLUTION INTRAMUSCULAR ONCE AS NEEDED
Status: DISCONTINUED | OUTPATIENT
Start: 2025-06-02 | End: 2025-06-03

## 2025-06-02 RX ORDER — OXYTOCIN 10 [USP'U]/ML
10 INJECTION, SOLUTION INTRAMUSCULAR; INTRAVENOUS ONCE AS NEEDED
Status: DISCONTINUED | OUTPATIENT
Start: 2025-06-02 | End: 2025-06-03

## 2025-06-02 RX ORDER — LABETALOL HYDROCHLORIDE 5 MG/ML
20 INJECTION, SOLUTION INTRAVENOUS ONCE AS NEEDED
Status: DISCONTINUED | OUTPATIENT
Start: 2025-06-02 | End: 2025-06-03

## 2025-06-02 RX ORDER — OXYTOCIN/0.9 % SODIUM CHLORIDE 30/500 ML
60 PLASTIC BAG, INJECTION (ML) INTRAVENOUS ONCE AS NEEDED
Status: DISCONTINUED | OUTPATIENT
Start: 2025-06-02 | End: 2025-06-03

## 2025-06-02 RX ORDER — OXYTOCIN/0.9 % SODIUM CHLORIDE 30/500 ML
60 PLASTIC BAG, INJECTION (ML) INTRAVENOUS ONCE AS NEEDED
Status: COMPLETED | OUTPATIENT
Start: 2025-06-02 | End: 2025-06-03

## 2025-06-02 RX ORDER — ONDANSETRON HYDROCHLORIDE 2 MG/ML
4 INJECTION, SOLUTION INTRAVENOUS EVERY 6 HOURS PRN
Status: DISCONTINUED | OUTPATIENT
Start: 2025-06-02 | End: 2025-06-03

## 2025-06-02 RX ORDER — ONDANSETRON 4 MG/1
4 TABLET, FILM COATED ORAL EVERY 6 HOURS PRN
Status: DISCONTINUED | OUTPATIENT
Start: 2025-06-02 | End: 2025-06-03

## 2025-06-02 RX ORDER — LOPERAMIDE HYDROCHLORIDE 2 MG/1
4 CAPSULE ORAL EVERY 2 HOUR PRN
Status: DISCONTINUED | OUTPATIENT
Start: 2025-06-02 | End: 2025-06-03

## 2025-06-02 RX ORDER — SODIUM CHLORIDE, SODIUM LACTATE, POTASSIUM CHLORIDE, CALCIUM CHLORIDE 600; 310; 30; 20 MG/100ML; MG/100ML; MG/100ML; MG/100ML
75 INJECTION, SOLUTION INTRAVENOUS CONTINUOUS
Status: DISCONTINUED | OUTPATIENT
Start: 2025-06-02 | End: 2025-06-03

## 2025-06-02 RX ORDER — METHYLERGONOVINE MALEATE 0.2 MG/ML
0.2 INJECTION INTRAVENOUS ONCE AS NEEDED
Status: DISCONTINUED | OUTPATIENT
Start: 2025-06-02 | End: 2025-06-03

## 2025-06-02 RX ORDER — OXYTOCIN/0.9 % SODIUM CHLORIDE 30/500 ML
2-30 PLASTIC BAG, INJECTION (ML) INTRAVENOUS CONTINUOUS
Status: DISCONTINUED | OUTPATIENT
Start: 2025-06-03 | End: 2025-06-03

## 2025-06-02 RX ORDER — HYDRALAZINE HYDROCHLORIDE 20 MG/ML
5 INJECTION INTRAMUSCULAR; INTRAVENOUS ONCE AS NEEDED
Status: DISCONTINUED | OUTPATIENT
Start: 2025-06-02 | End: 2025-06-03

## 2025-06-02 RX ORDER — MISOPROSTOL 200 UG/1
800 TABLET ORAL ONCE AS NEEDED
Status: DISCONTINUED | OUTPATIENT
Start: 2025-06-02 | End: 2025-06-03

## 2025-06-02 RX ORDER — TERBUTALINE SULFATE 1 MG/ML
0.25 INJECTION SUBCUTANEOUS ONCE AS NEEDED
Status: DISCONTINUED | OUTPATIENT
Start: 2025-06-02 | End: 2025-06-03

## 2025-06-02 RX ORDER — TRANEXAMIC ACID 1 G/10ML
1000 INJECTION, SOLUTION INTRAVENOUS ONCE AS NEEDED
Status: DISCONTINUED | OUTPATIENT
Start: 2025-06-02 | End: 2025-06-03

## 2025-06-02 RX ADMIN — MISOPROSTOL 25 MCG: 100 TABLET ORAL at 20:00

## 2025-06-02 SDOH — SOCIAL STABILITY: SOCIAL INSECURITY: ARE YOU MARRIED, WIDOWED, DIVORCED, SEPARATED, NEVER MARRIED, OR LIVING WITH A PARTNER?: MARRIED

## 2025-06-02 SDOH — SOCIAL STABILITY: SOCIAL NETWORK: IN A TYPICAL WEEK, HOW MANY TIMES DO YOU TALK ON THE PHONE WITH FAMILY, FRIENDS, OR NEIGHBORS?: THREE TIMES A WEEK

## 2025-06-02 SDOH — HEALTH STABILITY: PHYSICAL HEALTH: ON AVERAGE, HOW MANY DAYS PER WEEK DO YOU ENGAGE IN MODERATE TO STRENUOUS EXERCISE (LIKE A BRISK WALK)?: 4 DAYS

## 2025-06-02 SDOH — ECONOMIC STABILITY: FOOD INSECURITY: WITHIN THE PAST 12 MONTHS, YOU WORRIED THAT YOUR FOOD WOULD RUN OUT BEFORE YOU GOT THE MONEY TO BUY MORE.: NEVER TRUE

## 2025-06-02 SDOH — SOCIAL STABILITY: SOCIAL NETWORK: HOW OFTEN DO YOU GET TOGETHER WITH FRIENDS OR RELATIVES?: ONCE A WEEK

## 2025-06-02 SDOH — HEALTH STABILITY: PHYSICAL HEALTH
HOW OFTEN DO YOU NEED TO HAVE SOMEONE HELP YOU WHEN YOU READ INSTRUCTIONS, PAMPHLETS, OR OTHER WRITTEN MATERIAL FROM YOUR DOCTOR OR PHARMACY?: NEVER

## 2025-06-02 SDOH — HEALTH STABILITY: MENTAL HEALTH: HAVE YOU USED ANY SUBSTANCES (CANABIS, COCAINE, HEROIN, HALLUCINOGENS, INHALANTS, ETC.) IN THE PAST 12 MONTHS?: NO

## 2025-06-02 SDOH — HEALTH STABILITY: MENTAL HEALTH: HAVE YOU USED ANY PRESCRIPTION DRUGS OTHER THAN PRESCRIBED IN THE PAST 12 MONTHS?: NO

## 2025-06-02 SDOH — SOCIAL STABILITY: SOCIAL INSECURITY: DOES ANYONE TRY TO KEEP YOU FROM HAVING/CONTACTING OTHER FRIENDS OR DOING THINGS OUTSIDE YOUR HOME?: NO

## 2025-06-02 SDOH — HEALTH STABILITY: MENTAL HEALTH: SUICIDAL BEHAVIOR (LIFETIME): NO

## 2025-06-02 SDOH — SOCIAL STABILITY: SOCIAL NETWORK: HOW OFTEN DO YOU ATTEND MEETINGS OF THE CLUBS OR ORGANIZATIONS YOU BELONG TO?: NEVER

## 2025-06-02 SDOH — SOCIAL STABILITY: SOCIAL INSECURITY: ARE THERE ANY APPARENT SIGNS OF INJURIES/BEHAVIORS THAT COULD BE RELATED TO ABUSE/NEGLECT?: NO

## 2025-06-02 SDOH — HEALTH STABILITY: MENTAL HEALTH: WISH TO BE DEAD (PAST 1 MONTH): NO

## 2025-06-02 SDOH — ECONOMIC STABILITY: FOOD INSECURITY: HOW HARD IS IT FOR YOU TO PAY FOR THE VERY BASICS LIKE FOOD, HOUSING, MEDICAL CARE, AND HEATING?: NOT HARD AT ALL

## 2025-06-02 SDOH — ECONOMIC STABILITY: FOOD INSECURITY: WITHIN THE PAST 12 MONTHS, THE FOOD YOU BOUGHT JUST DIDN'T LAST AND YOU DIDN'T HAVE MONEY TO GET MORE.: NEVER TRUE

## 2025-06-02 SDOH — SOCIAL STABILITY: SOCIAL NETWORK: HOW OFTEN DO YOU ATTEND CHURCH OR RELIGIOUS SERVICES?: NEVER

## 2025-06-02 SDOH — HEALTH STABILITY: MENTAL HEALTH: NON-SPECIFIC ACTIVE SUICIDAL THOUGHTS (PAST 1 MONTH): NO

## 2025-06-02 SDOH — SOCIAL STABILITY: SOCIAL INSECURITY: ARE YOU OR HAVE YOU BEEN THREATENED OR ABUSED PHYSICALLY, EMOTIONALLY, OR SEXUALLY BY ANYONE?: NO

## 2025-06-02 SDOH — SOCIAL STABILITY: SOCIAL INSECURITY: WITHIN THE LAST YEAR, HAVE YOU BEEN AFRAID OF YOUR PARTNER OR EX-PARTNER?: NO

## 2025-06-02 SDOH — HEALTH STABILITY: MENTAL HEALTH
DO YOU FEEL STRESS - TENSE, RESTLESS, NERVOUS, OR ANXIOUS, OR UNABLE TO SLEEP AT NIGHT BECAUSE YOUR MIND IS TROUBLED ALL THE TIME - THESE DAYS?: ONLY A LITTLE

## 2025-06-02 SDOH — SOCIAL STABILITY: SOCIAL INSECURITY: DO YOU FEEL ANYONE HAS EXPLOITED OR TAKEN ADVANTAGE OF YOU FINANCIALLY OR OF YOUR PERSONAL PROPERTY?: NO

## 2025-06-02 SDOH — HEALTH STABILITY: PHYSICAL HEALTH: ON AVERAGE, HOW MANY MINUTES DO YOU ENGAGE IN EXERCISE AT THIS LEVEL?: 60 MIN

## 2025-06-02 SDOH — SOCIAL STABILITY: SOCIAL INSECURITY: WITHIN THE LAST YEAR, HAVE YOU BEEN HUMILIATED OR EMOTIONALLY ABUSED IN OTHER WAYS BY YOUR PARTNER OR EX-PARTNER?: NO

## 2025-06-02 SDOH — SOCIAL STABILITY: SOCIAL INSECURITY: HAVE YOU HAD THOUGHTS OF HARMING ANYONE ELSE?: NO

## 2025-06-02 SDOH — SOCIAL STABILITY: SOCIAL INSECURITY: HAVE YOU HAD ANY THOUGHTS OF HARMING ANYONE ELSE?: NO

## 2025-06-02 SDOH — SOCIAL STABILITY: SOCIAL INSECURITY: PHYSICAL ABUSE: DENIES

## 2025-06-02 SDOH — SOCIAL STABILITY: SOCIAL NETWORK
DO YOU BELONG TO ANY CLUBS OR ORGANIZATIONS SUCH AS CHURCH GROUPS, UNIONS, FRATERNAL OR ATHLETIC GROUPS, OR SCHOOL GROUPS?: NO

## 2025-06-02 SDOH — ECONOMIC STABILITY: HOUSING INSECURITY: DO YOU FEEL UNSAFE GOING BACK TO THE PLACE WHERE YOU ARE LIVING?: NO

## 2025-06-02 SDOH — ECONOMIC STABILITY: TRANSPORTATION INSECURITY: IN THE PAST 12 MONTHS, HAS LACK OF TRANSPORTATION KEPT YOU FROM MEDICAL APPOINTMENTS OR FROM GETTING MEDICATIONS?: NO

## 2025-06-02 SDOH — SOCIAL STABILITY: SOCIAL INSECURITY: VERBAL ABUSE: DENIES

## 2025-06-02 SDOH — SOCIAL STABILITY: SOCIAL INSECURITY: ABUSE SCREEN: ADULT

## 2025-06-02 SDOH — SOCIAL STABILITY: SOCIAL INSECURITY: HAS ANYONE EVER THREATENED TO HURT YOUR FAMILY OR YOUR PETS?: NO

## 2025-06-02 SDOH — HEALTH STABILITY: MENTAL HEALTH: WERE YOU ABLE TO COMPLETE ALL THE BEHAVIORAL HEALTH SCREENINGS?: YES

## 2025-06-02 ASSESSMENT — ACTIVITIES OF DAILY LIVING (ADL)
LACK_OF_TRANSPORTATION: NO
LACK_OF_TRANSPORTATION: NO

## 2025-06-02 ASSESSMENT — LIFESTYLE VARIABLES
HOW OFTEN DO YOU HAVE 6 OR MORE DRINKS ON ONE OCCASION: NEVER
SKIP TO QUESTIONS 9-10: 1
AUDIT-C TOTAL SCORE: 0
HOW OFTEN DO YOU HAVE A DRINK CONTAINING ALCOHOL: NEVER
AUDIT-C TOTAL SCORE: 0
HOW MANY STANDARD DRINKS CONTAINING ALCOHOL DO YOU HAVE ON A TYPICAL DAY: PATIENT DOES NOT DRINK

## 2025-06-02 ASSESSMENT — PAIN SCALES - GENERAL
PAINLEVEL_OUTOF10: 0 - NO PAIN

## 2025-06-02 ASSESSMENT — PATIENT HEALTH QUESTIONNAIRE - PHQ9
SUM OF ALL RESPONSES TO PHQ9 QUESTIONS 1 & 2: 0
1. LITTLE INTEREST OR PLEASURE IN DOING THINGS: NOT AT ALL
2. FEELING DOWN, DEPRESSED OR HOPELESS: NOT AT ALL

## 2025-06-02 NOTE — H&P
OB Admission H&P    Assessment/Plan    Elexandra Lombardo is a 29 y.o.  at 39w1d, ALEJANDRA: 2025, by Ultrasound, who presents for Induction of Labor.    Plan  -Admit to L&D, consented  -T&S, CBC, and Syphilis  -Epidural at patient request  -Recheck as clinically indicated by maternal or fetal status  -Plan to initiate induction with Cytotec and CRB    Fetal Status  -NST reactive, reassuring   -Presentation cephalic based on ultrasound  -EFW 3456 by extrapolation of 25 sono  -GBS Neg    Postpartum  Contraception Plan: not discussed    Pregnancy Problems (from 10/21/24 to present)       Problem Noted Diagnosed Resolved    Pregnancy with 39 completed weeks gestation ([facility]) 2025 by Shanice Hernández DO  No    Priority:  Medium       Young multigravida in third trimester ([facility]) 2024 by Sujey Ferrell MD  No    Priority:  Medium       Rh negative state in antepartum period ([facility]) 11/15/2024 by Sujey Ferrell MD  No    Priority:  Medium       38 weeks gestation of pregnancy ([facility]) 10/21/2024 by uSjey Ferrell MD  No    Priority:  Medium       Overview Addendum 2025  5:29 PM by Sujey Ferrell MD   Desired provider in labor: [] CNM  [] Physician   [] Either Acceptable  [x] Blood Products: [x] Yes, accepts [] No, needs counseling  [x] Initial BMI: 26.24   [x] Prenatal Labs:   [x] Cervical Cancer Screening up to date  [x] Rh status: negative  [] Screen for IPV and Substance Use Risk:  [x] Genetic Screening (cfDNA):  Normal  [x] First Trimester Anatomy Screen (11-13.6 wks):  [x] Baby ASA (initiated): yes  [x] Pregnancy dated by: 8 week sUltrasound    [x] Anatomy US: (19-20 wks) 25  [] Federal Sterilization consent signed (if indicated):  [x] 1hr GCT at 24-28wks: yes, normal  [x] Rhogam (if indicated): done  [] Fetal Surveillance (if indicated):  [x] Tdap (27-32 wks, may be given up to 36 wks if initial window missed): done  [] RSV (32-36 wks) (Sept. to end ):     [x] Feeding  Intentions: breastfeeding education provided  [] Postpartum Birth control method:   [x] GBS at 36 - 37 wks: negative  [x] 39 weeks discussion of IOL vs. Expectant management: S/p successful ECV on 25, vertex now. Scheduled for IOL on 25  [] Mode of delivery ( anticipated ): vaginal         Pregnancy with 37 weeks completed gestation ([facility]) 2025 by Shanice Hernández DO  2025 by Sujey Ferrell MD    Priority:  Medium       Breech presentation ([facility]) 2025 by Sujey Ferrell MD  2025 by Shnaice Hernández DO    Priority:  Medium       Nausea and vomiting in pregnancy prior to 22 weeks gestation 10/21/2024 by Sujey Ferrell MD  2025 by Sujey Ferrell MD    Priority:  Medium               Subjective   Good fetal movement.  Denies vaginal bleeding., Denies contractions., Denies leaking of fluid.      Prenatal Provider NASIM's/Ely    OB History    Para Term  AB Living   2 1 1 0 0 1   SAB IAB Ectopic Multiple Live Births   0 0 0 0 1      # Outcome Date GA Lbr Ky/2nd Weight Sex Type Anes PTL Lv   2 Current            1 Term 17 40w0d   M Vag-Spont EPI  ISAIAH       Surgical History[1]    Social History     Tobacco Use    Smoking status: Never    Smokeless tobacco: Never   Substance Use Topics    Alcohol use: Not Currently     Alcohol/week: 2.0 standard drinks of alcohol     Types: 1 Glasses of wine, 1 Standard drinks or equivalent per week     Comment: Sometimes every other week       Allergies[2]    Prescriptions Prior to Admission[3]  Objective     Last Vitals  Temp Pulse Resp BP MAP O2 Sat                   Blood Pressures    No data found in the last 1 encounters.          Physical Exam  General: NAD, mood appropriate  Cardiopulmonary: warm and well perfused, breathing comfortably on room air  Abdomen: Gravid, non-tender  Extremities: Symmetric  Speculum Exam: deferred  Cervix: 1-260/-2    Chaperone Present: Yes.  Chaperone Name/Title: RN  Examination Chaperoned:  Gynecological Exam     Fetal Monitoring  Baseline: 135 bpm, Variability: moderate,  Accelerations: present and Decelerations: none  Uterine Activity: Irregular contractions  Interpretation: Category 1 and Reactive    Bedside ultrasound: Yes    Labs in chart were reviewed.          Prenatal labs reviewed, not remarkable.    AGUSTO Dickinson           [1]   Past Surgical History:  Procedure Laterality Date    OTHER SURGICAL HISTORY  02/23/2021    No history of surgery   [2]   Allergies  Allergen Reactions    Avocado Nausea/vomiting     avocado    Carmelita Nausea/vomiting     carmelita   [3]   Medications Prior to Admission   Medication Sig Dispense Refill Last Dose/Taking    cyanocobalamin (Vitamin B-12) 250 mcg tablet Take 1 tablet (250 mcg) by mouth once daily.   6/1/2025 at  9:00 PM    MAGNESIUM ORAL Take by mouth.   6/1/2025 at  9:00 PM    prenatal vit no.124/iron/folic (PRENATAL VITAMIN ORAL) Take by mouth.   6/2/2025 at  8:30 AM    gabapentin (Neurontin) 300 mg capsule Take 1 capsule (300 mg) by mouth 3 times a day. 90 capsule 11 Unknown    sertraline (Zoloft) 50 mg tablet Take 1.5 tablets (75 mg) by mouth once daily. 135 tablet 1

## 2025-06-03 ENCOUNTER — ANESTHESIA EVENT (OUTPATIENT)
Dept: OBSTETRICS AND GYNECOLOGY | Facility: HOSPITAL | Age: 30
End: 2025-06-03
Payer: COMMERCIAL

## 2025-06-03 ENCOUNTER — ANESTHESIA (OUTPATIENT)
Dept: OBSTETRICS AND GYNECOLOGY | Facility: HOSPITAL | Age: 30
End: 2025-06-03
Payer: COMMERCIAL

## 2025-06-03 LAB
FETAL MATERNAL SCREEN: NORMAL
TREPONEMA PALLIDUM IGG+IGM AB [PRESENCE] IN SERUM OR PLASMA BY IMMUNOASSAY: NONREACTIVE

## 2025-06-03 PROCEDURE — 2500000004 HC RX 250 GENERAL PHARMACY W/ HCPCS (ALT 636 FOR OP/ED): Mod: JW | Performed by: NURSE ANESTHETIST, CERTIFIED REGISTERED

## 2025-06-03 PROCEDURE — 3700000014 EPIDURAL BLOCK: Performed by: NURSE ANESTHETIST, CERTIFIED REGISTERED

## 2025-06-03 PROCEDURE — 2500000005 HC RX 250 GENERAL PHARMACY W/O HCPCS: Performed by: ADVANCED PRACTICE MIDWIFE

## 2025-06-03 PROCEDURE — 2500000004 HC RX 250 GENERAL PHARMACY W/ HCPCS (ALT 636 FOR OP/ED): Performed by: OBSTETRICS & GYNECOLOGY

## 2025-06-03 PROCEDURE — 2500000004 HC RX 250 GENERAL PHARMACY W/ HCPCS (ALT 636 FOR OP/ED): Mod: JZ | Performed by: OBSTETRICS & GYNECOLOGY

## 2025-06-03 PROCEDURE — 59050 FETAL MONITOR W/REPORT: CPT

## 2025-06-03 PROCEDURE — 7100000016 HC LABOR RECOVERY PER HOUR

## 2025-06-03 PROCEDURE — 1100000001 HC PRIVATE ROOM DAILY

## 2025-06-03 PROCEDURE — 2500000001 HC RX 250 WO HCPCS SELF ADMINISTERED DRUGS (ALT 637 FOR MEDICARE OP): Performed by: ADVANCED PRACTICE MIDWIFE

## 2025-06-03 PROCEDURE — 7210000002 HC LABOR PER HOUR

## 2025-06-03 PROCEDURE — 10907ZC DRAINAGE OF AMNIOTIC FLUID, THERAPEUTIC FROM PRODUCTS OF CONCEPTION, VIA NATURAL OR ARTIFICIAL OPENING: ICD-10-PCS | Performed by: ADVANCED PRACTICE MIDWIFE

## 2025-06-03 PROCEDURE — 2500000004 HC RX 250 GENERAL PHARMACY W/ HCPCS (ALT 636 FOR OP/ED): Mod: JZ | Performed by: ADVANCED PRACTICE MIDWIFE

## 2025-06-03 PROCEDURE — 51701 INSERT BLADDER CATHETER: CPT

## 2025-06-03 PROCEDURE — 59409 OBSTETRICAL CARE: CPT | Performed by: ADVANCED PRACTICE MIDWIFE

## 2025-06-03 PROCEDURE — 85461 HEMOGLOBIN FETAL: CPT

## 2025-06-03 PROCEDURE — 36415 COLL VENOUS BLD VENIPUNCTURE: CPT | Performed by: ADVANCED PRACTICE MIDWIFE

## 2025-06-03 PROCEDURE — 2500000004 HC RX 250 GENERAL PHARMACY W/ HCPCS (ALT 636 FOR OP/ED): Mod: JZ | Performed by: NURSE ANESTHETIST, CERTIFIED REGISTERED

## 2025-06-03 RX ORDER — METHYLERGONOVINE MALEATE 0.2 MG/ML
0.2 INJECTION INTRAVENOUS ONCE AS NEEDED
Status: DISCONTINUED | OUTPATIENT
Start: 2025-06-03 | End: 2025-06-05 | Stop reason: HOSPADM

## 2025-06-03 RX ORDER — OXYTOCIN/0.9 % SODIUM CHLORIDE 30/500 ML
60 PLASTIC BAG, INJECTION (ML) INTRAVENOUS ONCE AS NEEDED
Status: DISCONTINUED | OUTPATIENT
Start: 2025-06-03 | End: 2025-06-05 | Stop reason: HOSPADM

## 2025-06-03 RX ORDER — ONDANSETRON 4 MG/1
4 TABLET, FILM COATED ORAL EVERY 6 HOURS PRN
Status: DISCONTINUED | OUTPATIENT
Start: 2025-06-03 | End: 2025-06-05 | Stop reason: HOSPADM

## 2025-06-03 RX ORDER — LIDOCAINE HYDROCHLORIDE 10 MG/ML
INJECTION, SOLUTION INFILTRATION; PERINEURAL AS NEEDED
Status: DISCONTINUED | OUTPATIENT
Start: 2025-06-03 | End: 2025-06-03

## 2025-06-03 RX ORDER — ENOXAPARIN SODIUM 100 MG/ML
40 INJECTION SUBCUTANEOUS EVERY 24 HOURS
Status: DISCONTINUED | OUTPATIENT
Start: 2025-06-03 | End: 2025-06-05 | Stop reason: HOSPADM

## 2025-06-03 RX ORDER — IBUPROFEN 600 MG/1
600 TABLET, FILM COATED ORAL EVERY 6 HOURS
Status: DISCONTINUED | OUTPATIENT
Start: 2025-06-03 | End: 2025-06-05 | Stop reason: HOSPADM

## 2025-06-03 RX ORDER — ACETAMINOPHEN 325 MG/1
975 TABLET ORAL EVERY 6 HOURS
Status: DISCONTINUED | OUTPATIENT
Start: 2025-06-03 | End: 2025-06-05 | Stop reason: HOSPADM

## 2025-06-03 RX ORDER — OXYTOCIN 10 [USP'U]/ML
10 INJECTION, SOLUTION INTRAMUSCULAR; INTRAVENOUS ONCE AS NEEDED
Status: DISCONTINUED | OUTPATIENT
Start: 2025-06-03 | End: 2025-06-05 | Stop reason: HOSPADM

## 2025-06-03 RX ORDER — ONDANSETRON HYDROCHLORIDE 2 MG/ML
4 INJECTION, SOLUTION INTRAVENOUS EVERY 6 HOURS PRN
Status: DISCONTINUED | OUTPATIENT
Start: 2025-06-03 | End: 2025-06-05 | Stop reason: HOSPADM

## 2025-06-03 RX ORDER — CARBOPROST TROMETHAMINE 250 UG/ML
250 INJECTION, SOLUTION INTRAMUSCULAR ONCE AS NEEDED
Status: DISCONTINUED | OUTPATIENT
Start: 2025-06-03 | End: 2025-06-05 | Stop reason: HOSPADM

## 2025-06-03 RX ORDER — DIPHENHYDRAMINE HYDROCHLORIDE 50 MG/ML
25 INJECTION, SOLUTION INTRAMUSCULAR; INTRAVENOUS EVERY 6 HOURS PRN
Status: DISCONTINUED | OUTPATIENT
Start: 2025-06-03 | End: 2025-06-05 | Stop reason: HOSPADM

## 2025-06-03 RX ORDER — ADHESIVE BANDAGE
10 BANDAGE TOPICAL
Status: DISCONTINUED | OUTPATIENT
Start: 2025-06-03 | End: 2025-06-05 | Stop reason: HOSPADM

## 2025-06-03 RX ORDER — SIMETHICONE 80 MG
80 TABLET,CHEWABLE ORAL 4 TIMES DAILY PRN
Status: DISCONTINUED | OUTPATIENT
Start: 2025-06-03 | End: 2025-06-05 | Stop reason: HOSPADM

## 2025-06-03 RX ORDER — HYDRALAZINE HYDROCHLORIDE 20 MG/ML
5 INJECTION INTRAMUSCULAR; INTRAVENOUS ONCE AS NEEDED
Status: DISCONTINUED | OUTPATIENT
Start: 2025-06-03 | End: 2025-06-05 | Stop reason: HOSPADM

## 2025-06-03 RX ORDER — MISOPROSTOL 200 UG/1
800 TABLET ORAL ONCE AS NEEDED
Status: DISCONTINUED | OUTPATIENT
Start: 2025-06-03 | End: 2025-06-05 | Stop reason: HOSPADM

## 2025-06-03 RX ORDER — DIPHENHYDRAMINE HCL 25 MG
25 CAPSULE ORAL EVERY 6 HOURS PRN
Status: DISCONTINUED | OUTPATIENT
Start: 2025-06-03 | End: 2025-06-05 | Stop reason: HOSPADM

## 2025-06-03 RX ORDER — POLYETHYLENE GLYCOL 3350 17 G/17G
17 POWDER, FOR SOLUTION ORAL 2 TIMES DAILY PRN
Status: DISCONTINUED | OUTPATIENT
Start: 2025-06-03 | End: 2025-06-05 | Stop reason: HOSPADM

## 2025-06-03 RX ORDER — LIDOCAINE HCL/EPINEPHRINE/PF 2%-1:200K
VIAL (ML) INJECTION AS NEEDED
Status: DISCONTINUED | OUTPATIENT
Start: 2025-06-03 | End: 2025-06-03

## 2025-06-03 RX ORDER — TRANEXAMIC ACID 1 G/10ML
1000 INJECTION, SOLUTION INTRAVENOUS ONCE AS NEEDED
Status: DISCONTINUED | OUTPATIENT
Start: 2025-06-03 | End: 2025-06-05 | Stop reason: HOSPADM

## 2025-06-03 RX ORDER — BUPIVACAINE HYDROCHLORIDE 2.5 MG/ML
INJECTION, SOLUTION EPIDURAL; INFILTRATION; INTRACAUDAL; PERINEURAL AS NEEDED
Status: DISCONTINUED | OUTPATIENT
Start: 2025-06-03 | End: 2025-06-03

## 2025-06-03 RX ORDER — LOPERAMIDE HYDROCHLORIDE 2 MG/1
4 CAPSULE ORAL EVERY 2 HOUR PRN
Status: DISCONTINUED | OUTPATIENT
Start: 2025-06-03 | End: 2025-06-05 | Stop reason: HOSPADM

## 2025-06-03 RX ORDER — FENTANYL/ROPIVACAINE/NS/PF 2MCG/ML-.2
0-25 PLASTIC BAG, INJECTION (ML) INJECTION CONTINUOUS
Status: DISCONTINUED | OUTPATIENT
Start: 2025-06-03 | End: 2025-06-03

## 2025-06-03 RX ORDER — LABETALOL HYDROCHLORIDE 5 MG/ML
20 INJECTION, SOLUTION INTRAVENOUS ONCE AS NEEDED
Status: DISCONTINUED | OUTPATIENT
Start: 2025-06-03 | End: 2025-06-05 | Stop reason: HOSPADM

## 2025-06-03 RX ADMIN — ACETAMINOPHEN 975 MG: 325 TABLET, FILM COATED ORAL at 08:10

## 2025-06-03 RX ADMIN — IBUPROFEN 600 MG: 600 TABLET ORAL at 20:53

## 2025-06-03 RX ADMIN — Medication 2 MILLI-UNITS/MIN: at 00:46

## 2025-06-03 RX ADMIN — Medication 10 ML/HR: at 01:53

## 2025-06-03 RX ADMIN — BENZOCAINE AND LEVOMENTHOL 1 APPLICATION: 200; 5 SPRAY TOPICAL at 11:49

## 2025-06-03 RX ADMIN — LIDOCAINE HYDROCHLORIDE,EPINEPHRINE BITARTRATE 3 ML: 20; .005 INJECTION, SOLUTION EPIDURAL; INFILTRATION; INTRACAUDAL; PERINEURAL at 01:46

## 2025-06-03 RX ADMIN — ACETAMINOPHEN 975 MG: 325 TABLET, FILM COATED ORAL at 20:53

## 2025-06-03 RX ADMIN — HUMAN RHO(D) IMMUNE GLOBULIN 300 MCG: 300 INJECTION, SOLUTION INTRAMUSCULAR at 18:02

## 2025-06-03 RX ADMIN — BENZOCAINE AND LEVOMENTHOL 1 APPLICATION: 200; 5 SPRAY TOPICAL at 08:10

## 2025-06-03 RX ADMIN — SODIUM CHLORIDE, POTASSIUM CHLORIDE, SODIUM LACTATE AND CALCIUM CHLORIDE 75 ML/HR: 600; 310; 30; 20 INJECTION, SOLUTION INTRAVENOUS at 00:45

## 2025-06-03 RX ADMIN — Medication 60 MILLI-UNITS/MIN: at 06:22

## 2025-06-03 RX ADMIN — ENOXAPARIN SODIUM 40 MG: 40 INJECTION SUBCUTANEOUS at 20:54

## 2025-06-03 RX ADMIN — BUPIVACAINE HYDROCHLORIDE 5 ML: 2.5 INJECTION, SOLUTION EPIDURAL; INFILTRATION; INTRACAUDAL; PERINEURAL at 01:50

## 2025-06-03 RX ADMIN — SODIUM CHLORIDE, POTASSIUM CHLORIDE, SODIUM LACTATE AND CALCIUM CHLORIDE 500 ML: 600; 310; 30; 20 INJECTION, SOLUTION INTRAVENOUS at 01:30

## 2025-06-03 RX ADMIN — Medication 1 EACH: at 11:49

## 2025-06-03 RX ADMIN — IBUPROFEN 600 MG: 600 TABLET ORAL at 14:50

## 2025-06-03 RX ADMIN — Medication 1 EACH: at 08:10

## 2025-06-03 RX ADMIN — IBUPROFEN 600 MG: 600 TABLET ORAL at 08:10

## 2025-06-03 RX ADMIN — ACETAMINOPHEN 975 MG: 325 TABLET, FILM COATED ORAL at 14:50

## 2025-06-03 RX ADMIN — LIDOCAINE HYDROCHLORIDE 3 ML: 10 INJECTION, SOLUTION INFILTRATION; PERINEURAL at 01:41

## 2025-06-03 SDOH — HEALTH STABILITY: MENTAL HEALTH: CURRENT SMOKER: 0

## 2025-06-03 ASSESSMENT — PAIN SCALES - GENERAL
PAINLEVEL_OUTOF10: 5 - MODERATE PAIN
PAINLEVEL_OUTOF10: 0 - NO PAIN
PAINLEVEL_OUTOF10: 5 - MODERATE PAIN
PAINLEVEL_OUTOF10: 0 - NO PAIN

## 2025-06-03 NOTE — LACTATION NOTE
Lactation Consultant Note  Lactation Consultation  Reason for Consult: Initial assessment  Consultant Name: Tonie WEBER    Maternal Information  Has mother  before?: Yes  How long did the mother previously breastfeed?: 1 year  Infant to breast within first 2 hours of birth?: Yes  Exclusive Pump and Bottle Feed: No    Maternal Assessment  Breast Assessment: Large, Soft, Compressible  Nipple Assessment: Intact, Erect  Areola Assessment: Normal    Infant Assessment  Infant Behavior: Awake  Infant Assessment:  (reluctant to suck on gloved finger/gaggy)    Feeding Assessment  Nutrition Source: Breastmilk  Feeding Method: Nursing at the breast  Feeding Position: Football/seated  Latch Assessment: Too sleepy    LATCH TOOL       Breast Pump       Other OB Lactation Tools       Patient Follow-up  Inpatient Lactation Follow-up Needed : Yes    Other OB Lactation Documentation       Recommendations/Summary  Baby placed in football hold forl altch attempt. Colostrum expressed. Babydid not wake for feed at this time. Baby placed skin to skin. Will attempt feed again in 2-3 hours. Reviewed milk supply patterns and  feeding patterns in the first and second 24 HOL.Mom was asked to attempt/feed baby at least every 2-3 hours or on demand with a goal of 8-12 feeds daily. Feeding cues reviewed.Breastfeeding education reviewed and questions answered. Mom aware of lactation support and asked to call out for feed assistance and with questions as needed.

## 2025-06-03 NOTE — PROGRESS NOTES
S: Tolerated CRB placement well  O: Cat 1 FHR  SVE: 1-2/60/-2--->CRB placed without difficulty   A/P: RR IOL  CEFM  AROM with pitocin once CRB is out   Anticipate Active labor  Epidural PRN  Regular diet   Symone Green, APRN-CNM

## 2025-06-03 NOTE — PROGRESS NOTES
[facility] LABOR PROGRESS NOTE    Subjective:    Patient is doing well with contractions.   Partner is supportive and present at the bedside.    Contraction pain is mild, denies need for pain intervention at this time.   Epidural:  planning prior to AROM    Objective:   Visit Vitals  /64   Pulse 85   Temp 37.4 °C (99.3 °F) (Temporal)   Resp 16       FHR:      Baseline 110  Variability; moderate  Accels; Reactive  Decels none                                      Contraction Frequency:  irregular, every 1-5 minutes    Cervical Exam: deferred, CRB out    Membranes:  are intact    Pitocin:  No  will begin 4 hours from last Cytotec dose at 0000    Assessment:  Elexandra Lombardo is a 29 y.o.  at 39w1d  IOL: RR, S/P cytotec and CRB  Latent Labor  Fetal Heart Rate Category 1 - overall reassuring  GBS: neg    Plan:  Begin pitocin at 0000  Epidural prior to AROM  Anticipate active labor  Activity as tolerated  Reg diet  Encouraged to rest  Frequent position changes  Continuous fetal monitoring  Re-evaluate in 2 hours or as needed    Reviewed with Dr Graves who agreed with plan of care    Electronically signed by AGUSTO Dickinson on 2025 at 11:07 PM

## 2025-06-03 NOTE — PROGRESS NOTES
[facility] LABOR PROGRESS NOTE    Subjective:    Patient is doing well with contractions.   Partner is supportive and present at the bedside.    Contraction pain is mild, planning epidural.   Epidural:  yes    Objective:   Visit Vitals  /81   Pulse 85   Temp 36.5 °C (97.7 °F) (Temporal)   Resp 16       FHR:      Baseline 125  Variability; moderate  Accels; Reactive  Decels intermittent periodic VD                                      Contraction Frequency:  regular, every 2-4 minutes    Cervical Exam: deferred    Membranes:  are intact, plan AROM with next exam    Pitocin:  Yes  2milliunits/minute    Assessment:  Elexandra Lombardo is a 29 y.o.  at 39w1d  IOL: RR, S/P cytotec and CRB, pitocin  Latent Labor  Fetal Heart Rate Category 1 - overall reassuring  GBS: neg     Plan:  Continue pitocin IOL per protocol  AROM with next exam  Epidural prior to AROM  Anticipate active labor  Activity as tolerated  Reg diet, clears following epidural  Encouraged to rest  Frequent position changes  Continuous fetal monitoring  Re-evaluate in 2 hours or as needed     Reviewed with Dr Graves who agreed with plan of care    Electronically signed by AGUSTO Dickinson on 6/3/2025 at 1:44 AM

## 2025-06-03 NOTE — L&D DELIVERY NOTE
Vaginal Delivery Note    Patient Name: Elexandra Lombardo  : 1995  MRN: [id number]  Age: 29 y.o.    /Para:   Gestational Age: 39w2d    Date of Delivery: 6/3/2025    Procedure: Normal Spontaneous Vaginal Delivery    Delivery Provider: Norma    Resident/Fellow/Other Assistant: N/A    Description of Procedure:  Delivery of viable infant under epidural anesthesia. Delayed clamping was performed. The infant was placed skin to skin. Cord gases were not sent.  Cord blood was collected. Placenta delivered intact and fundus was firm    Intact    Findings:   Amniotic fluid Clear, Male infant in Vertex Occiput Anterior presentation, APGARS 8,9  Birth Weight pending at the time of this note    Complications: None    Quantitative Blood Loss:   Delivery Blood Loss   Intrapartum & Postpartum: 25 1817 - 2537    Delivery Admission: 25 1752 - 25 0637         Intrapartum & Postpartum Delivery Admission    None               Blood products:      Uterotonics/Hemostatic Agent: IV Pitocin 30 units    Specimen:   Placenta  Delivered:    Appearance: Intact  Removal: Spontaneous    Disposition: discarded    Sponge/Instrument/Needle Counts: The sponge, lap and needle counts were correct.    Patient Disposition: Patient recovering on labor and delivery in stable condition.    Additional Procedures:  None    Lombardo, ElexandraBoy [[id number]]      Labor Events    Rupture date/time: 6/3/2025 0233  Rupture type: Artificial  Fluid color: Clear  Fluid odor: None  Labor type: Induced Onset of Labor  Labor allowed to proceed with plans for an attempted vaginal birth?: Yes  Induction: Oxytocin, AROM, Misoprostol, Pinto/EASI  Induction indications: Risk Reducing  Complications: None       Placenta    Placenta delivery date/time:   Placenta removal: Spontaneous  Placenta appearance: Intact  Placenta disposition: discarded       Cord    Vessels: 3 vessels  Complications: Nuchal  Nuchal intervention:  reduced  Nuchal cord description: loose nuchal cord  Number of loops: 1  Delayed cord clamping?: Yes  Cord blood disposition: Lab  Gases sent?: No  Stem cell collection (by provider): No       Lacerations    Episiotomy: None  Other lacerations?: No  Repair suture: None       Anesthesia    Method: Epidural       Operative Delivery    Forceps attempted?: No  Vacuum extractor attempted?: No       Shoulder Dystocia    Shoulder dystocia present?: No        Delivery    Birth date/time: 6/3/2025 06:17:00  Delivery type: Vaginal, Spontaneous  Complications: None       Apgars    Living status:   Apgar Component Scores:  1 min.:  5 min.:  10 min.:  15 min.:  20 min.:    Skin color:         Heart rate:         Reflex irritability:         Muscle tone:         Respiratory effort:         Total:                Delivery Providers    Delivering clinician: LUIS Dickinson-NASIM   Provider Role     Delivery Nurse     Nursery Nurse     Resident

## 2025-06-03 NOTE — ANESTHESIA PROCEDURE NOTES
Epidural Block    Patient location during procedure: OB  Start time: 6/3/2025 1:40 AM  End time: 6/3/2025 1:46 AM  Reason for block: primary anesthetic and labor analgesia  Staffing  Performed: CRNA   Authorized by: JANEEN Hernandez    Performed by: JANEEN Hernandez    Preanesthetic Checklist  Completed: patient identified, IV checked, risks and benefits discussed, surgical consent, pre-op evaluation, timeout performed and sterile techniques followed  Block Timeout  RN/Licensed healthcare professional reads aloud to the Anesthesia provider and entire team: Patient identity, procedure with side and site, patient position, and as applicable the availability of implants/special equipment/special requirements.  Patient on coagulant treatment: no  Timeout performed at: 6/3/2025 1:40 AM  Block Placement  Patient position: sitting  Prep: Betadine  Sterility prep: cap, drape, gloves, mask and hand  Patient monitoring: heart rate, EKG, continuous pulse oximetry and blood pressure  Approach: midline  Local numbing: lidocaine 1% to skin and subcutaneous tissues  Vertebral space: lumbar  Lumbar location: L3-L4  Epidural  Loss of resistance technique: air  Guidance: landmark technique        Needle  Needle type: Tuohy   Needle gauge: 17  Needle length: 8.9cm  Epidural catheter type: spring wound - closed end.  Catheter size: 19 G  Catheter at skin depth: 12 cm  Catheter securement method: clear occlusive dressing and surgical tape    Test dose: lidocaine 1.5% with epinephrine 1-to-200,000  Test dose: lidocaine 1.5% with epinephrine 1-to-200,000  Test dose result: no positive test dose    PCEA  Medication concentration used: 0.2% Ropivacaine with 2 mcg/mL Fentanyl  Dose (mL): 5  Lockout (minutes): 30  1-Hour Limit (boluses/hr): 5  Basal Rate: 10        Assessment  Sensory level: T10  Number of attempts: 1  Events: no positive test dose  Procedure assessment: patient tolerated procedure well with no immediate  complications

## 2025-06-03 NOTE — PROGRESS NOTES
[facility] LABOR PROGRESS NOTE    Subjective:    Patient is doing well with contractions.   Partner is supportive and present at the bedside.    Contraction pain is mild, describes as pressure, denies need for further pain intervention.   Epidural:  yes    Objective:   Visit Vitals  BP 98/56   Pulse 101   Temp 36.2 °C (97.2 °F) (Temporal)   Resp 16       FHR:      Baseline 115  Variability; mod  Accels; Reactive  Decels ED, intermittent periodic VD                                      Contraction Frequency:  regular, every 1-3 minutes    Cervical Exam: 8 cm, 90%, 0 station    Membranes:  AROM for 2 hours -clear    Pitocin:  No-  off r/t Cat 2 FHR    Assessment:  Elexandra Lombardo is a 29 y.o.  at 39w1d  IOL: RR, S/P cytotec and CRB, pitocin  Transitional Labor  Fetal Heart Rate Category 2 - overall reassuring  GBS: neg     Plan:  Continue pitocin IOL per protocol  Anticipate second stage of labor  Encouraged rest  Clear liq diet  Encouraged to rest  Frequent position changes  Continuous fetal monitoring  Re-evaluate as needed     Reviewed with Dr Graves who agreed with plan of care  Electronically signed by AGUSTO Dickinson on 6/3/2025 at 5:03 AM

## 2025-06-03 NOTE — ANESTHESIA PREPROCEDURE EVALUATION
"Patient: Elexandra Lombardo \"El\"    Evaluation Method: In-person visit    Procedure Information    Date: 06/03/25  Procedure: Labor Analgesia         Relevant Problems   Neuro   (+) Depression   (+) Generalized anxiety disorder      GYN   (+) 38 weeks gestation of pregnancy ([facility])   (+) Young multigravida in third trimester ([facility])       Clinical information reviewed:    Allergies  Meds  Problems              NPO Detail:  No data recorded     OB/GYN     Physical Exam    Airway  Mallampati: I  TM distance: >3 FB  Neck ROM: full  Mouth opening: 3 or more finger widths     Cardiovascular - normal exam   Dental - normal exam     Pulmonary - normal exam   Abdominal - normal exam           Anesthesia Plan    History of general anesthesia?: no  History of complications of general anesthesia?: no    ASA 2     epidural     The patient is not a current smoker.  Patient did not smoke on day of procedure.  Medical reason for not educating patient about risks of obstructive sleep apnea.  Anesthetic plan and risks discussed with patient.        "

## 2025-06-03 NOTE — ANESTHESIA POSTPROCEDURE EVALUATION
"Patient: Elexandra Lombardo \"El\"    Procedure Summary       Date: 06/03/25 Room / Location:     Anesthesia Start: 0140 Anesthesia Stop: 0617    Procedure: Labor Analgesia Diagnosis:     Scheduled Providers:  Responsible Provider: JANEEN Hernandez    Anesthesia Type: epidural ASA Status: 2            Anesthesia Type: epidural    Vitals Value Taken Time   /67 06/03/25 07:24     06/03/25 07:39   Pulse 84 06/03/25 07:37   Resp 18 06/03/25 07:39   SpO2 100 % 06/03/25 07:37       Anesthesia Post Evaluation    Patient participation: complete - patient participated  Level of consciousness: awake and alert  Pain management: adequate  Airway patency: patent  Cardiovascular status: acceptable  Respiratory status: acceptable  Hydration status: acceptable  Postoperative Nausea and Vomiting: none      No notable events documented.    "

## 2025-06-03 NOTE — CARE PLAN
The patient's goals for the shift include free from injury    The clinical goals for the shift include Healthy mom, healthy baby    Problem: Vaginal Birth or  Section  Goal: Fetal and maternal status remain reassuring during the birth process  Outcome: Progressing  Goal: Tolerate CRB for IOL placement maintenance until dislodgement/removal 12hrs after placement  Outcome: Progressing  Goal: Prevention of malpresentation/labor dystocia through delivery  Outcome: Progressing  Goal: Demonstrates labor coping techniques through delivery  Outcome: Progressing  Goal: Minimal s/sx of HDP and BP<160/110  Outcome: Progressing  Goal: No s/sx of infection through recovery  Outcome: Progressing  Goal: No s/sx of hemorrhage through recovery  Outcome: Progressing     Problem: Fall/Injury  Goal: Not fall by end of shift  Outcome: Progressing     Problem: Hypertensive Disorder of Pregnancy (HDP)  Goal: Minimal s/sx of HDP and BP<160/110  Outcome: Progressing  Goal: Adequate urine output (0.5 ml/kg/hr)  Outcome: Progressing     Problem: Pain - Adult  Goal: Verbalizes/displays adequate comfort level or baseline comfort level  Outcome: Progressing     Problem: Safety - Adult  Goal: Free from fall injury  Outcome: Progressing     Problem: Skin  Goal: Decreased wound size/increased tissue granulation at next dressing change  Outcome: Progressing  Goal: Participates in plan/prevention/treatment measures  Outcome: Progressing  Goal: Prevent/manage excess moisture  Outcome: Progressing  Goal: Prevent/minimize sheer/friction injuries  Outcome: Progressing  Goal: Promote/optimize nutrition  Outcome: Progressing  Goal: Promote skin healing  Outcome: Progressing

## 2025-06-04 PROCEDURE — 1100000001 HC PRIVATE ROOM DAILY

## 2025-06-04 PROCEDURE — 2500000001 HC RX 250 WO HCPCS SELF ADMINISTERED DRUGS (ALT 637 FOR MEDICARE OP): Performed by: ADVANCED PRACTICE MIDWIFE

## 2025-06-04 PROCEDURE — 2500000004 HC RX 250 GENERAL PHARMACY W/ HCPCS (ALT 636 FOR OP/ED): Mod: JZ | Performed by: ADVANCED PRACTICE MIDWIFE

## 2025-06-04 RX ADMIN — IBUPROFEN 600 MG: 600 TABLET ORAL at 01:53

## 2025-06-04 RX ADMIN — IBUPROFEN 600 MG: 600 TABLET ORAL at 14:45

## 2025-06-04 RX ADMIN — IBUPROFEN 600 MG: 600 TABLET ORAL at 08:27

## 2025-06-04 RX ADMIN — ENOXAPARIN SODIUM 40 MG: 40 INJECTION SUBCUTANEOUS at 21:00

## 2025-06-04 RX ADMIN — ACETAMINOPHEN 975 MG: 325 TABLET, FILM COATED ORAL at 01:53

## 2025-06-04 RX ADMIN — IBUPROFEN 600 MG: 600 TABLET ORAL at 21:00

## 2025-06-04 RX ADMIN — ACETAMINOPHEN 975 MG: 325 TABLET, FILM COATED ORAL at 14:45

## 2025-06-04 RX ADMIN — ACETAMINOPHEN 975 MG: 325 TABLET, FILM COATED ORAL at 08:27

## 2025-06-04 RX ADMIN — ACETAMINOPHEN 975 MG: 325 TABLET, FILM COATED ORAL at 21:00

## 2025-06-04 ASSESSMENT — PAIN SCALES - GENERAL
PAINLEVEL_OUTOF10: 1
PAINLEVEL_OUTOF10: 0 - NO PAIN
PAINLEVEL_OUTOF10: 0 - NO PAIN

## 2025-06-04 NOTE — CARE PLAN
The patient's goals for the shift include bond with baby    The clinical goals for the shift include maintain BP <160/110

## 2025-06-04 NOTE — PROGRESS NOTES
Postpartum Progress Note    Assessment/Plan   Elexandra Lombardo is a 29 y.o., , who delivered at 39w2d gestation and is now postpartum day 1.        Subjective   Her pain is well controlled with current medications  She is passing flatus  She is ambulating well  She is tolerating a Adult diet Regular  She reports no breast or nursing problems  She denies emotional concerns today   Her plan for contraception is none         Objective     Last Vitals:  Temp Pulse Resp BP MAP Pulse Ox   36.2 °C (97.2 °F) 60 18 114/81 87 97 %     Vitals Min/Max Last 24 Hours:  Temp  Min: 36.2 °C (97.2 °F)  Max: 37.1 °C (98.8 °F)  Pulse  Min: 60  Max: 80  Resp  Min: 18  Max: 20  BP  Min: 111/67  Max: 131/77  MAP (mmHg)  Min: 74  Max: 93    Intake/Output:     Intake/Output Summary (Last 24 hours) at 2025 0939  Last data filed at 6/3/2025 1000  Gross per 24 hour   Intake --   Output 600 ml   Net -600 ml       Physical Exam:  General: Examination reveals a well developed, well nourished, female, in no acute distress. She is alert and cooperative.  Lungs: breathing unlabored.  Fundus: firm.  Extremities: no redness or tenderness in the calves or thighs, no edema.  Neurological: alert, oriented, normal speech, no focal findings or movement disorder noted.  Psychological: awake and alert; oriented to person, place, and time.        Lab Data:  Labs in chart were reviewed.

## 2025-06-04 NOTE — LACTATION NOTE
Lactation Consultant Note  Lactation Consultation  Reason for Consult: Follow-up assessment  Consultant Name: annita WEBER    Maternal Information  Has mother  before?: Yes  How long did the mother previously breastfeed?: 1 year  Infant to breast within first 2 hours of birth?: Yes  Exclusive Pump and Bottle Feed: No    Maternal Assessment  Breast Assessment: Large, Soft, Compressible  Nipple Assessment: Intact, Erect  Areola Assessment: Normal    Infant Assessment  Infant Behavior: Awake, Feeding cues observed, Rooting response  Infant Assessment: Good lateral movement of tongue (tight suck, small mouth)    Feeding Assessment  Feeding Method: Nursing at the breast  Feeding Position: Football/seated, Both sides  Suck/Feeding: Sustained, Audible swallowing  Latch Assessment: Minimal assistance is needed, Sucks with long jaw movement, Instructed on deep latch    LATCH TOOL  Latch: Repeated attempts, hold nipple in mouth, stimulate to suck  Audible Swallowing: A few with stimulation  Type of Nipple: Everted (After stimulation)  Comfort (Breast/Nipple): Soft/non-tender  Hold (Positioning): Minimal assist, teach one side, mother does other, staff holds  LATCH Score: 7    Breast Pump       Other OB Lactation Tools       Patient Follow-up  Inpatient Lactation Follow-up Needed : Yes    Other OB Lactation Documentation       Recommendations/Summary  Assisted mom to latch baby to both sides in football hold. Baby latched well sucking wihth long jaw movement. Mom shown how to latch baby deeply with proper positioong and hand placement. Mom was asked to attempt/feed baby at least every 2-3 hours or on demand with a goal of 8-12 feeds daily. Feeding cues reviewed.Breastfeeding education reviewed and questions answered. Mom aware of lactation support and asked to call out for feed assistance and with questions as needed.

## 2025-06-05 ENCOUNTER — PHARMACY VISIT (OUTPATIENT)
Dept: PHARMACY | Facility: CLINIC | Age: 30
End: 2025-06-05
Payer: MEDICAID

## 2025-06-05 VITALS
BODY MASS INDEX: 31.59 KG/M2 | TEMPERATURE: 96.9 F | DIASTOLIC BLOOD PRESSURE: 72 MMHG | WEIGHT: 167.33 LBS | HEIGHT: 61 IN | OXYGEN SATURATION: 98 % | SYSTOLIC BLOOD PRESSURE: 111 MMHG | HEART RATE: 54 BPM | RESPIRATION RATE: 18 BRPM

## 2025-06-05 PROCEDURE — 7100000016 HC LABOR RECOVERY PER HOUR

## 2025-06-05 PROCEDURE — RXMED WILLOW AMBULATORY MEDICATION CHARGE

## 2025-06-05 PROCEDURE — 59050 FETAL MONITOR W/REPORT: CPT

## 2025-06-05 PROCEDURE — 90707 MMR VACCINE SC: CPT | Mod: JZ | Performed by: ADVANCED PRACTICE MIDWIFE

## 2025-06-05 PROCEDURE — 2500000001 HC RX 250 WO HCPCS SELF ADMINISTERED DRUGS (ALT 637 FOR MEDICARE OP): Performed by: ADVANCED PRACTICE MIDWIFE

## 2025-06-05 PROCEDURE — 2500000004 HC RX 250 GENERAL PHARMACY W/ HCPCS (ALT 636 FOR OP/ED): Mod: JZ | Performed by: ADVANCED PRACTICE MIDWIFE

## 2025-06-05 PROCEDURE — 90471 IMMUNIZATION ADMIN: CPT | Performed by: ADVANCED PRACTICE MIDWIFE

## 2025-06-05 PROCEDURE — 7210000002 HC LABOR PER HOUR

## 2025-06-05 RX ORDER — FERROUS SULFATE 325(65) MG
325 TABLET, DELAYED RELEASE (ENTERIC COATED) ORAL DAILY
Qty: 90 TABLET | Refills: 0 | Status: SHIPPED | OUTPATIENT
Start: 2025-06-05 | End: 2025-09-03

## 2025-06-05 RX ORDER — IBUPROFEN 600 MG/1
600 TABLET, FILM COATED ORAL EVERY 6 HOURS
Qty: 56 TABLET | Refills: 0 | Status: SHIPPED | OUTPATIENT
Start: 2025-06-05 | End: 2025-06-19

## 2025-06-05 RX ORDER — ACETAMINOPHEN 500 MG
975 TABLET ORAL EVERY 6 HOURS
Qty: 112 TABLET | Refills: 0 | Status: SHIPPED | OUTPATIENT
Start: 2025-06-05 | End: 2025-06-19

## 2025-06-05 RX ORDER — DOCUSATE SODIUM 100 MG/1
100 CAPSULE, LIQUID FILLED ORAL 2 TIMES DAILY PRN
Qty: 30 CAPSULE | Refills: 5 | Status: SHIPPED | OUTPATIENT
Start: 2025-06-05

## 2025-06-05 RX ADMIN — MEASLES, MUMPS, AND RUBELLA VIRUS VACCINE LIVE 0.5 ML: 1000; 12500; 1000 INJECTION, POWDER, LYOPHILIZED, FOR SUSPENSION SUBCUTANEOUS at 14:13

## 2025-06-05 RX ADMIN — ACETAMINOPHEN 975 MG: 325 TABLET, FILM COATED ORAL at 03:30

## 2025-06-05 RX ADMIN — ACETAMINOPHEN 975 MG: 325 TABLET, FILM COATED ORAL at 09:29

## 2025-06-05 RX ADMIN — IBUPROFEN 600 MG: 600 TABLET ORAL at 03:30

## 2025-06-05 RX ADMIN — IBUPROFEN 600 MG: 600 TABLET ORAL at 09:29

## 2025-06-05 ASSESSMENT — PAIN SCALES - GENERAL
PAINLEVEL_OUTOF10: 3
PAINLEVEL_OUTOF10: 0 - NO PAIN

## 2025-06-05 ASSESSMENT — PAIN DESCRIPTION - DESCRIPTORS: DESCRIPTORS: CRAMPING

## 2025-06-05 NOTE — CARE PLAN
The patient's goals for the shift include Llanos with infant, breastfeeding    The clinical goals for the shift include VS to remain WNL    Over the shift, the patient made progress toward the following goals. Barriers to progression include none    Problem: Fall/Injury  Goal: Not fall by end of shift  2025 by Yeny Thompson RN  Outcome: Met  2025 by Yeny Thompson RN  Outcome: Progressing     Problem: Hypertensive Disorder of Pregnancy (HDP)  Goal: Minimal s/sx of HDP and BP<160/110  2025 by Yeny Thompson RN  Outcome: Met  2025 by Yeny Thompson RN  Outcome: Progressing  Goal: Adequate urine output (0.5 ml/kg/hr)  2025 by Yeny Thompson RN  Outcome: Met  2025 by Yeny Thompson RN  Outcome: Progressing     Problem: Pain - Adult  Goal: Verbalizes/displays adequate comfort level or baseline comfort level  2025 by Yeny Thompson RN  Outcome: Met  2025 0915 by Yeny Thompson RN  Outcome: Progressing     Problem: Safety - Adult  Goal: Free from fall injury  2025 by Yeny Thompson RN  Outcome: Met  2025 by Yeny Thompson RN  Outcome: Progressing     Problem: Postpartum  Goal: Experiences normal postpartum course  2025 by Yeny Thompson RN  Outcome: Met  2025 by Yeny Thompson RN  Outcome: Progressing  Goal: Appropriate maternal -  bonding  2025 by Yeny Thompson RN  Outcome: Met  2025 by Yeny Thompson RN  Outcome: Progressing  Goal: Establish and maintain infant feeding pattern for adequate nutrition  2025 by Yeny Thompson RN  Outcome: Met  2025 by Yeny Thompson RN  Outcome: Progressing     Patient discharged home with infant. To follow up with OB.

## 2025-06-05 NOTE — DISCHARGE INSTRUCTIONS

## 2025-06-05 NOTE — DISCHARGE SUMMARY
Discharge Summary    Admission Date: 2025  Discharge Date: ***    Discharge Diagnosis   (spontaneous vaginal delivery) ([facility])    Hospital Course  Delivery Date: 6/3/2025 6:17 AM  Delivery type: Vaginal, Spontaneous   GA at delivery: 39w2d  Outcome: Living  Anesthesia during delivery: Epidural  Intrapartum complications: None  Feeding method: Breastfeeding Status: Yes     Procedures: {ob procedure:8161367076}  Contraception at discharge: {CONTRACEPTION:80517}  ***    Pertinent Physical Exam At Time of Discharge  {Antepartum Examination:2408006567}  {Postpartum Examination:8762670490}    Last Vitals:  Temp Pulse Resp BP MAP Pulse Ox   36.1 °C (96.9 °F) (!) 54 18 111/72 78 98 %     Discharge Meds     Your medication list        START taking these medications        Instructions Last Dose Given Next Dose Due   acetaminophen 500 mg tablet  Commonly known as: Tylenol      Take 2 tablets (1,000 mg) by mouth every 6 hours for 14 days.       docusate sodium 100 mg capsule  Commonly known as: Colace      Take 1 capsule (100 mg) by mouth 2 times a day as needed for constipation.       ferrous sulfate 325 (65 Fe) mg EC tablet      Take 1 tablet by mouth once daily. Do not crush, chew, or split.       ibuprofen 600 mg tablet      Take 1 tablet (600 mg) by mouth every 6 hours for 14 days.              CONTINUE taking these medications        Instructions Last Dose Given Next Dose Due   PRENATAL VITAMIN ORAL           sertraline 50 mg tablet  Commonly known as: Zoloft      Take 1.5 tablets (75 mg) by mouth once daily.              STOP taking these medications      cyanocobalamin 250 mcg tablet  Commonly known as: Vitamin B-12        gabapentin 300 mg capsule  Commonly known as: Neurontin        MAGNESIUM ORAL                  Where to Get Your Medications        These medications were sent to [facility]  [address], [place] [address] [address]      Hours: 8 AM to 6 PM Mon-Fri, 9 AM to 1 PM Saturday  Phone: [phone]   acetaminophen 500 mg tablet  docusate sodium 100 mg capsule  ferrous sulfate 325 (65 Fe) mg EC tablet  ibuprofen 600 mg tablet          Complications Requiring Follow-Up  ***    Test Results Pending At Discharge  Pending Labs       No current pending labs.            Outpatient Follow-Up  Future Appointments   Date Time Provider Department Center   6/16/2025  1:10 PM Sujey Ferrell MD [id number] spent *** minutes in the professional and overall care of this patient.      LUIS Lubin-NASIM

## 2025-06-05 NOTE — LACTATION NOTE
Lactation Consultant Note  Lactation Consultation  Reason for Consult: Follow-up assessment  Consultant Name: Tonie AGEE    Maternal Information  Has mother  before?: Yes  How long did the mother previously breastfeed?: 1 yr  Infant to breast within first 2 hours of birth?: Yes  Exclusive Pump and Bottle Feed: No    Maternal Assessment  Breast Assessment: Large, Soft, Compressible  Nipple Assessment: Intact, Erect  Areola Assessment: Normal    Infant Assessment  Infant Behavior: Awake    Feeding Assessment  Nutrition Source: Breastmilk  Feeding Method: Nursing at the breast    LATCH TOOL       Breast Pump       Other OB Lactation Tools       Patient Follow-up       Other OB Lactation Documentation       Recommendations/Summary  In for follow up assessment with mom. Mom states things are improving. Infant just fed, so NO LATCH seen at this assessment. Reviewed outpatient resources and BF basics with mom. No questions at this time. Encouraged mom to call if needed.

## 2025-06-05 NOTE — CARE PLAN
Problem: Safety - Adult  Goal: Free from fall injury  Outcome: Progressing     Problem: Postpartum  Goal: Experiences normal postpartum course  Outcome: Progressing  Goal: Appropriate maternal -  bonding  Outcome: Progressing  Goal: Establish and maintain infant feeding pattern for adequate nutrition  Outcome: Progressing     Problem: Fall/Injury  Goal: Not fall by end of shift  Outcome: Progressing       The patient's goals for the shift include safety    The clinical goals for the shift include bonding/feeding/skin to skin

## 2025-06-09 ENCOUNTER — APPOINTMENT (OUTPATIENT)
Dept: OBSTETRICS AND GYNECOLOGY | Facility: CLINIC | Age: 30
End: 2025-06-09
Payer: COMMERCIAL

## 2025-06-10 ENCOUNTER — TELEPHONE (OUTPATIENT)
Dept: OBSTETRICS AND GYNECOLOGY | Facility: CLINIC | Age: 30
End: 2025-06-10
Payer: COMMERCIAL

## 2025-06-10 DIAGNOSIS — N94.9 VAGINAL DISCOMFORT: ICD-10-CM

## 2025-06-10 NOTE — TELEPHONE ENCOUNTER
Patient delivered 6/3/25  Feels like she has UTI ?  Urethra hurts - feels like pulled a groin muscle   Frequency, not sure if these things are normal healing or has uti

## 2025-06-11 ENCOUNTER — TELEPHONE (OUTPATIENT)
Dept: OBSTETRICS AND GYNECOLOGY | Facility: CLINIC | Age: 30
End: 2025-06-11
Payer: COMMERCIAL

## 2025-06-11 DIAGNOSIS — N94.9 VAGINAL DISCOMFORT: ICD-10-CM

## 2025-06-11 NOTE — TELEPHONE ENCOUNTER
Patient returned call. Patient states that she is extremely sore and has increased urination. Patient denies burning with urination but is concerned about having a UTI. Discussed with patient potential soreness due to recent vaginal delivery postpartum 1 week. Will pend urine culture for Dr. Ferrell to authorize to check for UTI. Patient instructed to go to outpatient lab to collect once order is active.

## 2025-06-13 PROBLEM — O26.899 RH NEGATIVE STATE IN ANTEPARTUM PERIOD (HHS-HCC): Status: RESOLVED | Noted: 2024-11-15 | Resolved: 2025-06-13

## 2025-06-13 PROBLEM — Z67.91 RH NEGATIVE STATE IN ANTEPARTUM PERIOD (HHS-HCC): Status: RESOLVED | Noted: 2024-11-15 | Resolved: 2025-06-13

## 2025-06-13 PROBLEM — Z3A.38 38 WEEKS GESTATION OF PREGNANCY (HHS-HCC): Status: RESOLVED | Noted: 2024-10-21 | Resolved: 2025-06-13

## 2025-06-13 PROBLEM — O09.623 YOUNG MULTIGRAVIDA IN THIRD TRIMESTER (HHS-HCC): Status: RESOLVED | Noted: 2024-12-13 | Resolved: 2025-06-13

## 2025-06-13 NOTE — PROGRESS NOTES
29 y.o.  presenting for 2 weeks postpartum follow-up     Delivery Date: 6/3/25  GA at Delivery: 39  Type of Delivery:     Pregnancy notable for:    Concerns: none    Pain: controlled  Lacerations: none  Lochia: none  Menses: No  Sexual Intimacy: No  Contraceptive Method: None  Feeding Method: Breast  Lactation Consult Needed?: Yes    Birth Trauma: No  Bonding with Baby: well with Male [unfilled]   Mood:       OBGyn Exam     Problem List Items Addressed This Visit       Encounter for postpartum visit - Primary       IMPRESSIONS:  Thank you for coming to your postpartum visit. Everything seems to be recovering appropriately at this time. You are free to resume normal activity. Please don't hesitate to call us for breast complaints, abnormal bleeding, mood changes, or other concerning symptoms - we have many good treatment options for these issues.     No orders of the defined types were placed in this encounter.  Desires Mirena IUD at 6 weeks  Breast feeding, continue PNV  Lactation consult  We look forward to seeing you again at your next scheduled visit in 1 month    Sujey Ferrell MD

## 2025-06-14 NOTE — DISCHARGE SUMMARY
Discharge Summary    Admission Date: 2025  Discharge Date: 2025    Discharge Diagnosis   (spontaneous vaginal delivery) ([facility])    Hospital Course  Delivery Date: 6/3/2025 6:17 AM  Delivery type: Vaginal, Spontaneous   GA at delivery: 39w2d  Outcome: Living  Anesthesia during delivery: Epidural  Intrapartum complications: None  Feeding method: Breastfeeding Status: Yes     Procedures: none  Contraception at discharge: defer to office     Pertinent Physical Exam At Time of Discharge  breasts soft, nontender, no nipple trauma  abdomen non-distended  fundus firm, u/1  Perineum intact  Lochia scant      Last Vitals:  Temp Pulse Resp BP MAP Pulse Ox   36.1 °C (96.9 °F) (!) 54 18 111/72 78 98 %     Discharge Meds     Your medication list        START taking these medications        Instructions Last Dose Given Next Dose Due   acetaminophen 500 mg tablet  Commonly known as: Tylenol      Take 2 tablets (1,000 mg) by mouth every 6 hours for 14 days.       docusate sodium 100 mg capsule  Commonly known as: Colace      Take 1 capsule (100 mg) by mouth 2 times a day as needed for constipation.       ferrous sulfate 325 (65 Fe) mg EC tablet      Take 1 tablet by mouth once daily. Do not crush, chew, or split.       ibuprofen 600 mg tablet      Take 1 tablet (600 mg) by mouth every 6 hours for 14 days.              CONTINUE taking these medications        Instructions Last Dose Given Next Dose Due   PRENATAL VITAMIN ORAL           sertraline 50 mg tablet  Commonly known as: Zoloft      Take 1.5 tablets (75 mg) by mouth once daily.              STOP taking these medications      cyanocobalamin 250 mcg tablet  Commonly known as: Vitamin B-12        gabapentin 300 mg capsule  Commonly known as: Neurontin        MAGNESIUM ORAL                  Where to Get Your Medications        These medications were sent to [facility]  [address], [place] [address] [address]      Hours: 8 AM to 6 PM Mon-Fri, 9 AM  to 1 PM Saturday Phone: [phone]   acetaminophen 500 mg tablet  docusate sodium 100 mg capsule  ferrous sulfate 325 (65 Fe) mg EC tablet  ibuprofen 600 mg tablet          Complications Requiring Follow-Up  - Anemia, PO iron sent   - Hx depression and ÉPREZ, low threshold for calling with mental health concerns     Test Results Pending At Discharge  Pending Labs       No current pending labs.            Outpatient Follow-Up  Future Appointments   Date Time Provider Department [place]   6/16/2025  1:10 PM Sujey Ferrell MD [id number] spent 30 minutes in the professional and overall care of this patient.      LUIS Lubin-NASIM

## 2025-06-16 ENCOUNTER — APPOINTMENT (OUTPATIENT)
Dept: OBSTETRICS AND GYNECOLOGY | Facility: CLINIC | Age: 30
End: 2025-06-16
Payer: COMMERCIAL

## 2025-06-16 VITALS — BODY MASS INDEX: 27.22 KG/M2 | WEIGHT: 144 LBS | DIASTOLIC BLOOD PRESSURE: 60 MMHG | SYSTOLIC BLOOD PRESSURE: 102 MMHG

## 2025-06-16 DIAGNOSIS — Z11.3 SCREEN FOR STD (SEXUALLY TRANSMITTED DISEASE): ICD-10-CM

## 2025-06-16 PROCEDURE — 0503F POSTPARTUM CARE VISIT: CPT | Performed by: OBSTETRICS & GYNECOLOGY

## 2025-06-16 ASSESSMENT — EDINBURGH POSTNATAL DEPRESSION SCALE (EPDS)
I HAVE FELT SAD OR MISERABLE: NO, NOT AT ALL
I HAVE BEEN ABLE TO LAUGH AND SEE THE FUNNY SIDE OF THINGS: AS MUCH AS I ALWAYS COULD
TOTAL SCORE: 5
I HAVE BEEN SO UNHAPPY THAT I HAVE BEEN CRYING: NO, NEVER
THE THOUGHT OF HARMING MYSELF HAS OCCURRED TO ME: NEVER
I HAVE BEEN ANXIOUS OR WORRIED FOR NO GOOD REASON: YES, SOMETIMES
I HAVE FELT SCARED OR PANICKY FOR NO GOOD REASON: NO, NOT MUCH
I HAVE LOOKED FORWARD WITH ENJOYMENT TO THINGS: AS MUCH AS I EVER DID
I HAVE BLAMED MYSELF UNNECESSARILY WHEN THINGS WENT WRONG: NOT VERY OFTEN
THINGS HAVE BEEN GETTING ON TOP OF ME: NO, MOST OF THE TIME I HAVE COPED QUITE WELL
I HAVE BEEN SO UNHAPPY THAT I HAVE HAD DIFFICULTY SLEEPING: NOT AT ALL

## 2025-06-17 LAB
C TRACH RRNA SPEC QL NAA+PROBE: NOT DETECTED
N GONORRHOEA RRNA SPEC QL NAA+PROBE: NOT DETECTED
QUEST GC CT AMPLIFIED (ALWAYS MESSAGE): NORMAL
QUEST GC CT AMPLIFIED (ALWAYS MESSAGE): NORMAL
T VAGINALIS RRNA SPEC QL NAA+PROBE: NOT DETECTED

## 2025-06-24 ENCOUNTER — APPOINTMENT (OUTPATIENT)
Dept: OBSTETRICS AND GYNECOLOGY | Facility: CLINIC | Age: 30
End: 2025-06-24
Payer: COMMERCIAL

## 2025-07-11 NOTE — PROGRESS NOTES
"Patient ID: Elexandra Lombardo \"Micheal\" is a 29 y.o. female.    IUD Insertion    Performed by: Sujey Ferrell MD  Authorized by: Sujey Ferrell MD    Procedure: IUD insertion    Pregnancy risk: reasonably certain the patient is not pregnant    Pre-Medications:  None.  Patient forgot to take NSAIDs  Date/Time of Insertion:  7/14/2025 12:30 PM  Immediately prior to procedure a time out was called: yes    Pelvic exam performed: no    Speculum placed in vagina: yes    Cervix cleaned and prepped: yes    Tenaculum/Allis/Ring Forceps applied to cervix: no    Anesthesia used: no    Cervix dilated: yes    Cervix dilated with:  Cervical os finder  IUD inserted without complications: yes    OSM: 52 mg levonorgestrel (Mirena) IUD 20 mcg/day  Patient tolerated procedure well: yes    Inserted with ultrasound guidance: no    Transvaginal sono confirmed fundal placement: no    Estimated blood loss (mL):  0  Intended removal date: 8 years      "

## 2025-07-14 ENCOUNTER — APPOINTMENT (OUTPATIENT)
Dept: OBSTETRICS AND GYNECOLOGY | Facility: CLINIC | Age: 30
End: 2025-07-14
Payer: COMMERCIAL

## 2025-07-14 VITALS — SYSTOLIC BLOOD PRESSURE: 108 MMHG | BODY MASS INDEX: 27.98 KG/M2 | WEIGHT: 148 LBS | DIASTOLIC BLOOD PRESSURE: 80 MMHG

## 2025-07-14 DIAGNOSIS — Z11.3 SCREEN FOR STD (SEXUALLY TRANSMITTED DISEASE): ICD-10-CM

## 2025-07-14 DIAGNOSIS — Z30.430 ENCOUNTER FOR INSERTION OF MIRENA IUD: ICD-10-CM

## 2025-07-14 DIAGNOSIS — Z32.02 PREGNANCY TEST NEGATIVE: ICD-10-CM

## 2025-07-14 LAB — PREGNANCY TEST URINE, POC: NEGATIVE

## 2025-07-14 PROCEDURE — 58300 INSERT INTRAUTERINE DEVICE: CPT | Performed by: OBSTETRICS & GYNECOLOGY

## 2025-07-14 PROCEDURE — 81025 URINE PREGNANCY TEST: CPT | Performed by: OBSTETRICS & GYNECOLOGY

## 2025-08-20 PROBLEM — Z97.5 PRESENCE OF MIRENA IUD: Status: ACTIVE | Noted: 2025-08-20

## 2025-08-20 PROBLEM — Z30.431 CONTRACEPTIVE, SURVEILLANCE, INTRAUTERINE DEVICE: Status: ACTIVE | Noted: 2025-08-20

## 2025-08-21 ENCOUNTER — APPOINTMENT (OUTPATIENT)
Dept: OBSTETRICS AND GYNECOLOGY | Facility: CLINIC | Age: 30
End: 2025-08-21
Payer: COMMERCIAL

## 2025-08-29 ENCOUNTER — APPOINTMENT (OUTPATIENT)
Dept: OBSTETRICS AND GYNECOLOGY | Facility: CLINIC | Age: 30
End: 2025-08-29
Payer: COMMERCIAL

## 2025-08-29 VITALS — BODY MASS INDEX: 28.36 KG/M2 | DIASTOLIC BLOOD PRESSURE: 80 MMHG | WEIGHT: 150 LBS | SYSTOLIC BLOOD PRESSURE: 110 MMHG

## 2025-08-29 DIAGNOSIS — Z97.5 PRESENCE OF MIRENA IUD: ICD-10-CM

## 2025-08-29 DIAGNOSIS — Z30.431 CONTRACEPTIVE, SURVEILLANCE, INTRAUTERINE DEVICE: Primary | ICD-10-CM

## 2025-08-29 RX ORDER — LEVONORGESTREL 52 MG/1
1 INTRAUTERINE DEVICE INTRAUTERINE ONCE
COMMUNITY
Start: 2025-07-14

## 2026-09-17 ENCOUNTER — APPOINTMENT (OUTPATIENT)
Dept: OBSTETRICS AND GYNECOLOGY | Facility: CLINIC | Age: 31
End: 2026-09-17
Payer: COMMERCIAL